# Patient Record
Sex: FEMALE | Race: WHITE | NOT HISPANIC OR LATINO | Employment: UNEMPLOYED | URBAN - METROPOLITAN AREA
[De-identification: names, ages, dates, MRNs, and addresses within clinical notes are randomized per-mention and may not be internally consistent; named-entity substitution may affect disease eponyms.]

---

## 2020-01-16 ENCOUNTER — HISTORICAL (OUTPATIENT)
Dept: PHYSICAL THERAPY | Facility: HOSPITAL | Age: 50
End: 2020-01-16

## 2020-01-22 ENCOUNTER — HISTORICAL (OUTPATIENT)
Dept: PHYSICAL THERAPY | Facility: HOSPITAL | Age: 50
End: 2020-01-22

## 2020-01-23 ENCOUNTER — HISTORICAL (OUTPATIENT)
Dept: PHYSICAL THERAPY | Facility: HOSPITAL | Age: 50
End: 2020-01-23

## 2020-01-24 ENCOUNTER — HISTORICAL (OUTPATIENT)
Dept: PHYSICAL THERAPY | Facility: HOSPITAL | Age: 50
End: 2020-01-24

## 2020-01-27 ENCOUNTER — HISTORICAL (OUTPATIENT)
Dept: PHYSICAL THERAPY | Facility: HOSPITAL | Age: 50
End: 2020-01-27

## 2020-01-29 ENCOUNTER — HISTORICAL (OUTPATIENT)
Dept: PHYSICAL THERAPY | Facility: HOSPITAL | Age: 50
End: 2020-01-29

## 2020-01-31 ENCOUNTER — HISTORICAL (OUTPATIENT)
Dept: PHYSICAL THERAPY | Facility: HOSPITAL | Age: 50
End: 2020-01-31

## 2020-02-03 ENCOUNTER — HISTORICAL (OUTPATIENT)
Dept: PHYSICAL THERAPY | Facility: HOSPITAL | Age: 50
End: 2020-02-03

## 2020-02-05 ENCOUNTER — HISTORICAL (OUTPATIENT)
Dept: PHYSICAL THERAPY | Facility: HOSPITAL | Age: 50
End: 2020-02-05

## 2020-02-07 ENCOUNTER — HISTORICAL (OUTPATIENT)
Dept: PHYSICAL THERAPY | Facility: HOSPITAL | Age: 50
End: 2020-02-07

## 2020-02-10 ENCOUNTER — HISTORICAL (OUTPATIENT)
Dept: PHYSICAL THERAPY | Facility: HOSPITAL | Age: 50
End: 2020-02-10

## 2020-02-12 ENCOUNTER — HISTORICAL (OUTPATIENT)
Dept: PHYSICAL THERAPY | Facility: HOSPITAL | Age: 50
End: 2020-02-12

## 2020-02-14 ENCOUNTER — HISTORICAL (OUTPATIENT)
Dept: PHYSICAL THERAPY | Facility: HOSPITAL | Age: 50
End: 2020-02-14

## 2020-02-17 ENCOUNTER — HISTORICAL (OUTPATIENT)
Dept: PHYSICAL THERAPY | Facility: HOSPITAL | Age: 50
End: 2020-02-17

## 2020-02-19 ENCOUNTER — HISTORICAL (OUTPATIENT)
Dept: PHYSICAL THERAPY | Facility: HOSPITAL | Age: 50
End: 2020-02-19

## 2020-02-21 ENCOUNTER — HISTORICAL (OUTPATIENT)
Dept: PHYSICAL THERAPY | Facility: HOSPITAL | Age: 50
End: 2020-02-21

## 2020-02-24 ENCOUNTER — HISTORICAL (OUTPATIENT)
Dept: PHYSICAL THERAPY | Facility: HOSPITAL | Age: 50
End: 2020-02-24

## 2020-02-26 ENCOUNTER — HISTORICAL (OUTPATIENT)
Dept: PHYSICAL THERAPY | Facility: HOSPITAL | Age: 50
End: 2020-02-26

## 2020-02-28 ENCOUNTER — HISTORICAL (OUTPATIENT)
Dept: PHYSICAL THERAPY | Facility: HOSPITAL | Age: 50
End: 2020-02-28

## 2020-03-02 ENCOUNTER — HISTORICAL (OUTPATIENT)
Dept: PHYSICAL THERAPY | Facility: HOSPITAL | Age: 50
End: 2020-03-02

## 2020-03-04 ENCOUNTER — HISTORICAL (OUTPATIENT)
Dept: PHYSICAL THERAPY | Facility: HOSPITAL | Age: 50
End: 2020-03-04

## 2020-03-10 ENCOUNTER — HISTORICAL (OUTPATIENT)
Dept: RADIOLOGY | Facility: HOSPITAL | Age: 50
End: 2020-03-10

## 2020-03-11 ENCOUNTER — HISTORICAL (OUTPATIENT)
Dept: PHYSICAL THERAPY | Facility: HOSPITAL | Age: 50
End: 2020-03-11

## 2020-06-16 ENCOUNTER — HISTORICAL (OUTPATIENT)
Dept: PHYSICAL THERAPY | Facility: HOSPITAL | Age: 50
End: 2020-06-16

## 2020-11-06 ENCOUNTER — HISTORICAL (OUTPATIENT)
Dept: SLEEP MEDICINE | Facility: HOSPITAL | Age: 50
End: 2020-11-06

## 2021-03-09 ENCOUNTER — HISTORICAL (OUTPATIENT)
Dept: ADMINISTRATIVE | Facility: HOSPITAL | Age: 51
End: 2021-03-09

## 2021-03-15 LAB
FINAL CULTURE: NORMAL
FINAL CULTURE: NORMAL

## 2021-04-08 ENCOUNTER — HISTORICAL (OUTPATIENT)
Dept: ADMINISTRATIVE | Facility: HOSPITAL | Age: 51
End: 2021-04-08

## 2021-04-08 LAB
ABS NEUT (OLG): 3.04 X10(3)/MCL (ref 2.1–9.2)
ALBUMIN SERPL-MCNC: 3.1 GM/DL (ref 3.5–5)
ALBUMIN/GLOB SERPL: 0.8 RATIO (ref 1.1–2)
ALP SERPL-CCNC: 71 UNIT/L (ref 40–150)
ALT SERPL-CCNC: 9 UNIT/L (ref 0–55)
ANISOCYTOSIS BLD QL SMEAR: ABNORMAL
AST SERPL-CCNC: 8 UNIT/L (ref 5–34)
BASOPHILS # BLD AUTO: 0.02 X10(3)/MCL (ref 0–0.2)
BASOPHILS NFR BLD AUTO: 0.4 % (ref 0–1)
BILIRUB SERPL-MCNC: 0.4 MG/DL (ref 0.2–1.2)
BILIRUBIN DIRECT+TOT PNL SERPL-MCNC: 0.2 MG/DL (ref 0–0.5)
BILIRUBIN DIRECT+TOT PNL SERPL-MCNC: 0.2 MG/DL (ref 0–0.8)
BUN SERPL-MCNC: 11 MG/DL (ref 9.8–20.1)
CALCIUM SERPL-MCNC: 10.3 MG/DL (ref 8.4–10.2)
CHLORIDE SERPL-SCNC: 105 MMOL/L (ref 98–107)
CHOLEST SERPL-MCNC: 123 MG/DL
CHOLEST/HDLC SERPL: 3 {RATIO} (ref 0–5)
CO2 SERPL-SCNC: 29 MMOL/L (ref 22–29)
CREAT SERPL-MCNC: 0.83 MG/DL (ref 0.57–1.11)
DEPRECATED CALCIDIOL+CALCIFEROL SERPL-MC: 29.1 NG/ML (ref 30–80)
EOSINOPHIL # BLD AUTO: 0.43 X10(3)/MCL (ref 0–0.9)
EOSINOPHIL NFR BLD AUTO: 8.3 % (ref 0–6.4)
ERYTHROCYTE [DISTWIDTH] IN BLOOD BY AUTOMATED COUNT: 15.8 % (ref 11.5–17)
FERRITIN SERPL-MCNC: 40.54 NG/ML (ref 4.63–204)
GLOBULIN SER-MCNC: 4 GM/DL (ref 2.4–3.5)
GLUCOSE SERPL-MCNC: 107 MG/DL (ref 74–100)
HCT VFR BLD AUTO: 33.5 % (ref 37–47)
HDLC SERPL-MCNC: 46 MG/DL (ref 40–60)
HGB BLD-MCNC: 10 GM/DL (ref 12–16)
HYPOCHROMIA BLD QL SMEAR: ABNORMAL
IMM GRANULOCYTES # BLD AUTO: 0.09 10*3/UL (ref 0–0.02)
IMM GRANULOCYTES NFR BLD AUTO: 1.7 % (ref 0–0.43)
IRON SATN MFR SERPL: 14 % (ref 20–50)
IRON SERPL-MCNC: 36 UG/DL (ref 50–170)
LDLC SERPL CALC-MCNC: 61 MG/DL (ref 50–140)
LYMPHOCYTES # BLD AUTO: 1.23 X10(3)/MCL (ref 0.6–4.6)
LYMPHOCYTES NFR BLD AUTO: 23.7 % (ref 16–44)
MCH RBC QN AUTO: 25.5 PG (ref 27–31)
MCHC RBC AUTO-ENTMCNC: 29.9 GM/DL (ref 33–36)
MCV RBC AUTO: 85.5 FL (ref 80–94)
MONOCYTES # BLD AUTO: 0.39 X10(3)/MCL (ref 0.1–1.3)
MONOCYTES NFR BLD AUTO: 7.5 % (ref 4–12.1)
NEUTROPHILS # BLD AUTO: 3.04 X10(3)/MCL (ref 2.1–9.2)
NEUTROPHILS NFR BLD AUTO: 58.4 % (ref 43–73)
NRBC BLD AUTO-RTO: 0 % (ref 0–0.2)
PLATELET # BLD AUTO: 329 X10(3)/MCL (ref 130–400)
PLATELET # BLD EST: ADEQUATE 10*3/UL
PMV BLD AUTO: 9 FL (ref 7.4–10.4)
POTASSIUM SERPL-SCNC: 4.2 MMOL/L (ref 3.5–5.1)
PREALB SERPL-MCNC: 14.3 MG/DL (ref 16–38)
PROT SERPL-MCNC: 7.1 GM/DL (ref 6.4–8.3)
RBC # BLD AUTO: 3.92 X10(6)/MCL (ref 4.2–5.4)
RBC MORPH BLD: ABNORMAL
SODIUM SERPL-SCNC: 143 MMOL/L (ref 136–145)
TIBC SERPL-MCNC: 213 UG/DL (ref 70–310)
TIBC SERPL-MCNC: 249 UG/DL (ref 250–450)
TRANSFERRIN SERPL-MCNC: 224 MG/DL (ref 180–382)
TRIGL SERPL-MCNC: 80 MG/DL (ref 0–150)
VLDLC SERPL CALC-MCNC: 16 MG/DL
WBC # SPEC AUTO: 5.2 X10(3)/MCL (ref 4.5–11.5)

## 2021-04-12 LAB
ABS NEUT (OLG): 3.4 X10(3)/MCL (ref 2.1–9.2)
ALBUMIN SERPL-MCNC: 3.2 GM/DL (ref 3.5–5)
ALBUMIN/GLOB SERPL: 0.8 RATIO (ref 1.1–2)
ALP SERPL-CCNC: 71 UNIT/L (ref 40–150)
ALT SERPL-CCNC: 7 UNIT/L (ref 0–55)
ANISOCYTOSIS BLD QL SMEAR: ABNORMAL
AST SERPL-CCNC: 8 UNIT/L (ref 5–34)
BASOPHILS # BLD AUTO: 0.05 X10(3)/MCL (ref 0–0.2)
BASOPHILS NFR BLD AUTO: 0.9 % (ref 0–1)
BILIRUB SERPL-MCNC: 0.3 MG/DL (ref 0.2–1.2)
BILIRUBIN DIRECT+TOT PNL SERPL-MCNC: 0.1 MG/DL (ref 0–0.8)
BILIRUBIN DIRECT+TOT PNL SERPL-MCNC: 0.2 MG/DL (ref 0–0.5)
BUN SERPL-MCNC: 18.9 MG/DL (ref 9.8–20.1)
CALCIUM SERPL-MCNC: 10.4 MG/DL (ref 8.4–10.2)
CHLORIDE SERPL-SCNC: 108 MMOL/L (ref 98–107)
CO2 SERPL-SCNC: 29 MMOL/L (ref 22–29)
CREAT SERPL-MCNC: 0.84 MG/DL (ref 0.57–1.11)
EOSINOPHIL # BLD AUTO: 0.31 X10(3)/MCL (ref 0–0.9)
EOSINOPHIL NFR BLD AUTO: 5.4 % (ref 0–6.4)
ERYTHROCYTE [DISTWIDTH] IN BLOOD BY AUTOMATED COUNT: 15.9 % (ref 11.5–17)
GLOBULIN SER-MCNC: 3.9 GM/DL (ref 2.4–3.5)
GLUCOSE SERPL-MCNC: 102 MG/DL (ref 74–100)
HCT VFR BLD AUTO: 33.8 % (ref 37–47)
HGB BLD-MCNC: 9.9 GM/DL (ref 12–16)
HYPOCHROMIA BLD QL SMEAR: ABNORMAL
IMM GRANULOCYTES # BLD AUTO: 0.06 10*3/UL (ref 0–0.02)
IMM GRANULOCYTES NFR BLD AUTO: 1 % (ref 0–0.43)
LYMPHOCYTES # BLD AUTO: 1.46 X10(3)/MCL (ref 0.6–4.6)
LYMPHOCYTES NFR BLD AUTO: 25.4 % (ref 16–44)
MCH RBC QN AUTO: 25.2 PG (ref 27–31)
MCHC RBC AUTO-ENTMCNC: 29.3 GM/DL (ref 33–36)
MCV RBC AUTO: 86 FL (ref 80–94)
MONOCYTES # BLD AUTO: 0.47 X10(3)/MCL (ref 0.1–1.3)
MONOCYTES NFR BLD AUTO: 8.2 % (ref 4–12.1)
NEUTROPHILS # BLD AUTO: 3.4 X10(3)/MCL (ref 2.1–9.2)
NEUTROPHILS NFR BLD AUTO: 59.1 % (ref 43–73)
NRBC BLD AUTO-RTO: 0 % (ref 0–0.2)
PLATELET # BLD AUTO: 305 X10(3)/MCL (ref 130–400)
PLATELET # BLD EST: ADEQUATE 10*3/UL
PMV BLD AUTO: 8.8 FL (ref 7.4–10.4)
POTASSIUM SERPL-SCNC: 4 MMOL/L (ref 3.5–5.1)
PREALB SERPL-MCNC: 17.6 MG/DL (ref 16–38)
PROT SERPL-MCNC: 7.1 GM/DL (ref 6.4–8.3)
RBC # BLD AUTO: 3.93 X10(6)/MCL (ref 4.2–5.4)
RBC MORPH BLD: ABNORMAL
SODIUM SERPL-SCNC: 147 MMOL/L (ref 136–145)
WBC # SPEC AUTO: 5.8 X10(3)/MCL (ref 4.5–11.5)

## 2021-04-13 LAB
ALBUMIN SERPL-MCNC: 3.1 GM/DL (ref 3.5–5)
ALBUMIN/GLOB SERPL: 0.9 RATIO (ref 1.1–2)
ALP SERPL-CCNC: 65 UNIT/L (ref 40–150)
ALT SERPL-CCNC: 8 UNIT/L (ref 0–55)
AST SERPL-CCNC: 6 UNIT/L (ref 5–34)
BILIRUB SERPL-MCNC: 0.3 MG/DL (ref 0.2–1.2)
BILIRUBIN DIRECT+TOT PNL SERPL-MCNC: 0.1 MG/DL (ref 0–0.5)
BILIRUBIN DIRECT+TOT PNL SERPL-MCNC: 0.2 MG/DL (ref 0–0.8)
BUN SERPL-MCNC: 18.7 MG/DL (ref 9.8–20.1)
CALCIUM SERPL-MCNC: 9.8 MG/DL (ref 8.4–10.2)
CHLORIDE SERPL-SCNC: 108 MMOL/L (ref 98–107)
CO2 SERPL-SCNC: 31 MMOL/L (ref 22–29)
CREAT SERPL-MCNC: 0.73 MG/DL (ref 0.57–1.11)
GLOBULIN SER-MCNC: 3.5 GM/DL (ref 2.4–3.5)
GLUCOSE SERPL-MCNC: 100 MG/DL (ref 74–100)
POTASSIUM SERPL-SCNC: 4 MMOL/L (ref 3.5–5.1)
PROT SERPL-MCNC: 6.6 GM/DL (ref 6.4–8.3)
SODIUM SERPL-SCNC: 145 MMOL/L (ref 136–145)

## 2021-04-19 LAB
ABS NEUT (OLG): 3.77 X10(3)/MCL (ref 2.1–9.2)
ALBUMIN SERPL-MCNC: 3.3 GM/DL (ref 3.5–5)
ALBUMIN/GLOB SERPL: 0.9 RATIO (ref 1.1–2)
ALP SERPL-CCNC: 64 UNIT/L (ref 40–150)
ALT SERPL-CCNC: 11 UNIT/L (ref 0–55)
AST SERPL-CCNC: 10 UNIT/L (ref 5–34)
BASOPHILS # BLD AUTO: 0.05 X10(3)/MCL (ref 0–0.2)
BASOPHILS NFR BLD AUTO: 0.9 % (ref 0–1)
BILIRUB SERPL-MCNC: 0.4 MG/DL (ref 0.2–1.2)
BILIRUBIN DIRECT+TOT PNL SERPL-MCNC: 0.2 MG/DL (ref 0–0.5)
BILIRUBIN DIRECT+TOT PNL SERPL-MCNC: 0.2 MG/DL (ref 0–0.8)
BUN SERPL-MCNC: 16.9 MG/DL (ref 9.8–20.1)
CALCIUM SERPL-MCNC: 9.9 MG/DL (ref 8.4–10.2)
CHLORIDE SERPL-SCNC: 108 MMOL/L (ref 98–107)
CO2 SERPL-SCNC: 29 MMOL/L (ref 22–29)
CREAT SERPL-MCNC: 0.83 MG/DL (ref 0.57–1.11)
EOSINOPHIL # BLD AUTO: 0.12 X10(3)/MCL (ref 0–0.9)
EOSINOPHIL NFR BLD AUTO: 2.1 % (ref 0–6.4)
ERYTHROCYTE [DISTWIDTH] IN BLOOD BY AUTOMATED COUNT: 16.1 % (ref 11.5–17)
GLOBULIN SER-MCNC: 3.5 GM/DL (ref 2.4–3.5)
GLUCOSE SERPL-MCNC: 111 MG/DL (ref 74–100)
HCT VFR BLD AUTO: 34.2 % (ref 37–47)
HGB BLD-MCNC: 9.9 GM/DL (ref 12–16)
HYPOCHROMIA BLD QL SMEAR: NORMAL
IMM GRANULOCYTES # BLD AUTO: 0.04 10*3/UL (ref 0–0.02)
IMM GRANULOCYTES NFR BLD AUTO: 0.7 % (ref 0–0.43)
LYMPHOCYTES # BLD AUTO: 1.29 X10(3)/MCL (ref 0.6–4.6)
LYMPHOCYTES NFR BLD AUTO: 22.1 % (ref 16–44)
MCH RBC QN AUTO: 24.9 PG (ref 27–31)
MCHC RBC AUTO-ENTMCNC: 28.9 GM/DL (ref 33–36)
MCV RBC AUTO: 85.9 FL (ref 80–94)
MONOCYTES # BLD AUTO: 0.56 X10(3)/MCL (ref 0.1–1.3)
MONOCYTES NFR BLD AUTO: 9.6 % (ref 4–12.1)
NEUTROPHILS # BLD AUTO: 3.77 X10(3)/MCL (ref 2.1–9.2)
NEUTROPHILS NFR BLD AUTO: 64.6 % (ref 43–73)
NRBC BLD AUTO-RTO: 0 % (ref 0–0.2)
PLATELET # BLD AUTO: 226 X10(3)/MCL (ref 130–400)
PLATELET # BLD EST: ADEQUATE 10*3/UL
PMV BLD AUTO: 9.3 FL (ref 7.4–10.4)
POTASSIUM SERPL-SCNC: 4 MMOL/L (ref 3.5–5.1)
PREALB SERPL-MCNC: 18.4 MG/DL (ref 16–38)
PROT SERPL-MCNC: 6.8 GM/DL (ref 6.4–8.3)
RBC # BLD AUTO: 3.98 X10(6)/MCL (ref 4.2–5.4)
SODIUM SERPL-SCNC: 143 MMOL/L (ref 136–145)
WBC # SPEC AUTO: 5.8 X10(3)/MCL (ref 4.5–11.5)

## 2021-04-26 LAB
ABS NEUT (OLG): 3.24 X10(3)/MCL (ref 2.1–9.2)
ALBUMIN SERPL-MCNC: 3.4 GM/DL (ref 3.5–5)
ALBUMIN/GLOB SERPL: 1.1 RATIO (ref 1.1–2)
ALP SERPL-CCNC: 59 UNIT/L (ref 40–150)
ALT SERPL-CCNC: 12 UNIT/L (ref 0–55)
ANISOCYTOSIS BLD QL SMEAR: ABNORMAL
AST SERPL-CCNC: 11 UNIT/L (ref 5–34)
BASOPHILS # BLD AUTO: 0.05 X10(3)/MCL (ref 0–0.2)
BASOPHILS NFR BLD AUTO: 0.9 % (ref 0–1)
BILIRUB SERPL-MCNC: 0.4 MG/DL (ref 0.2–1.2)
BILIRUBIN DIRECT+TOT PNL SERPL-MCNC: 0.2 MG/DL (ref 0–0.5)
BILIRUBIN DIRECT+TOT PNL SERPL-MCNC: 0.2 MG/DL (ref 0–0.8)
BUN SERPL-MCNC: 18.5 MG/DL (ref 9.8–20.1)
CALCIUM SERPL-MCNC: 9.9 MG/DL (ref 8.4–10.2)
CHLORIDE SERPL-SCNC: 105 MMOL/L (ref 98–107)
CO2 SERPL-SCNC: 28 MMOL/L (ref 22–29)
CREAT SERPL-MCNC: 0.75 MG/DL (ref 0.57–1.11)
EOSINOPHIL # BLD AUTO: 0.21 X10(3)/MCL (ref 0–0.9)
EOSINOPHIL NFR BLD AUTO: 3.6 % (ref 0–6.4)
ERYTHROCYTE [DISTWIDTH] IN BLOOD BY AUTOMATED COUNT: 17.3 % (ref 11.5–17)
GLOBULIN SER-MCNC: 3.2 GM/DL (ref 2.4–3.5)
GLUCOSE SERPL-MCNC: 109 MG/DL (ref 74–100)
HCT VFR BLD AUTO: 33 % (ref 37–47)
HGB BLD-MCNC: 9.7 GM/DL (ref 12–16)
HYPOCHROMIA BLD QL SMEAR: ABNORMAL
IMM GRANULOCYTES # BLD AUTO: 0.05 10*3/UL (ref 0–0.02)
IMM GRANULOCYTES NFR BLD AUTO: 0.9 % (ref 0–0.43)
LYMPHOCYTES # BLD AUTO: 1.59 X10(3)/MCL (ref 0.6–4.6)
LYMPHOCYTES NFR BLD AUTO: 27.6 % (ref 16–44)
MCH RBC QN AUTO: 25.2 PG (ref 27–31)
MCHC RBC AUTO-ENTMCNC: 29.4 GM/DL (ref 33–36)
MCV RBC AUTO: 85.7 FL (ref 80–94)
MICROCYTES BLD QL SMEAR: SLIGHT
MONOCYTES # BLD AUTO: 0.62 X10(3)/MCL (ref 0.1–1.3)
MONOCYTES NFR BLD AUTO: 10.8 % (ref 4–12.1)
NEUTROPHILS # BLD AUTO: 3.24 X10(3)/MCL (ref 2.1–9.2)
NEUTROPHILS NFR BLD AUTO: 56.2 % (ref 43–73)
NRBC BLD AUTO-RTO: 0 % (ref 0–0.2)
PLATELET # BLD AUTO: 224 X10(3)/MCL (ref 130–400)
PLATELET # BLD EST: ADEQUATE 10*3/UL
PMV BLD AUTO: 9.5 FL (ref 7.4–10.4)
POTASSIUM SERPL-SCNC: 4 MMOL/L (ref 3.5–5.1)
PREALB SERPL-MCNC: 21.4 MG/DL (ref 16–38)
PROT SERPL-MCNC: 6.6 GM/DL (ref 6.4–8.3)
RBC # BLD AUTO: 3.85 X10(6)/MCL (ref 4.2–5.4)
RBC MORPH BLD: ABNORMAL
SODIUM SERPL-SCNC: 140 MMOL/L (ref 136–145)
WBC # SPEC AUTO: 5.8 X10(3)/MCL (ref 4.5–11.5)

## 2021-05-03 LAB
ABS NEUT (OLG): 2.75 X10(3)/MCL (ref 2.1–9.2)
ALBUMIN SERPL-MCNC: 3.3 GM/DL (ref 3.5–5)
ALBUMIN/GLOB SERPL: 1 RATIO (ref 1.1–2)
ALP SERPL-CCNC: 60 UNIT/L (ref 40–150)
ALT SERPL-CCNC: 12 UNIT/L (ref 0–55)
ANISOCYTOSIS BLD QL SMEAR: ABNORMAL
AST SERPL-CCNC: 11 UNIT/L (ref 5–34)
BASOPHILS # BLD AUTO: 0.04 X10(3)/MCL (ref 0–0.2)
BASOPHILS NFR BLD AUTO: 0.8 % (ref 0–1)
BILIRUB SERPL-MCNC: 0.4 MG/DL (ref 0.2–1.2)
BILIRUBIN DIRECT+TOT PNL SERPL-MCNC: 0.2 MG/DL (ref 0–0.5)
BILIRUBIN DIRECT+TOT PNL SERPL-MCNC: 0.2 MG/DL (ref 0–0.8)
BUN SERPL-MCNC: 15.6 MG/DL (ref 9.8–20.1)
CALCIUM SERPL-MCNC: 9.9 MG/DL (ref 8.4–10.2)
CHLORIDE SERPL-SCNC: 105 MMOL/L (ref 98–107)
CO2 SERPL-SCNC: 28 MMOL/L (ref 22–29)
CREAT SERPL-MCNC: 0.83 MG/DL (ref 0.57–1.11)
EOSINOPHIL # BLD AUTO: 0.23 X10(3)/MCL (ref 0–0.9)
EOSINOPHIL NFR BLD AUTO: 4.8 % (ref 0–6.4)
ERYTHROCYTE [DISTWIDTH] IN BLOOD BY AUTOMATED COUNT: 18 % (ref 11.5–17)
GLOBULIN SER-MCNC: 3.3 GM/DL (ref 2.4–3.5)
GLUCOSE SERPL-MCNC: 100 MG/DL (ref 74–100)
HCT VFR BLD AUTO: 32.9 % (ref 37–47)
HGB BLD-MCNC: 9.7 GM/DL (ref 12–16)
HYPOCHROMIA BLD QL SMEAR: ABNORMAL
IMM GRANULOCYTES # BLD AUTO: 0.03 10*3/UL (ref 0–0.02)
IMM GRANULOCYTES NFR BLD AUTO: 0.6 % (ref 0–0.43)
LYMPHOCYTES # BLD AUTO: 1.26 X10(3)/MCL (ref 0.6–4.6)
LYMPHOCYTES NFR BLD AUTO: 26.3 % (ref 16–44)
MACROCYTES BLD QL SMEAR: SLIGHT
MCH RBC QN AUTO: 25.7 PG (ref 27–31)
MCHC RBC AUTO-ENTMCNC: 29.5 GM/DL (ref 33–36)
MCV RBC AUTO: 87.3 FL (ref 80–94)
MICROCYTES BLD QL SMEAR: SLIGHT
MONOCYTES # BLD AUTO: 0.48 X10(3)/MCL (ref 0.1–1.3)
MONOCYTES NFR BLD AUTO: 10 % (ref 4–12.1)
NEUTROPHILS # BLD AUTO: 2.75 X10(3)/MCL (ref 2.1–9.2)
NEUTROPHILS NFR BLD AUTO: 57.5 % (ref 43–73)
NRBC BLD AUTO-RTO: 0 % (ref 0–0.2)
PLATELET # BLD AUTO: 188 X10(3)/MCL (ref 130–400)
PLATELET # BLD EST: ADEQUATE 10*3/UL
PMV BLD AUTO: 9.5 FL (ref 7.4–10.4)
POTASSIUM SERPL-SCNC: 4.2 MMOL/L (ref 3.5–5.1)
PREALB SERPL-MCNC: 19.9 MG/DL (ref 16–38)
PROT SERPL-MCNC: 6.6 GM/DL (ref 6.4–8.3)
RBC # BLD AUTO: 3.77 X10(6)/MCL (ref 4.2–5.4)
RBC MORPH BLD: ABNORMAL
SODIUM SERPL-SCNC: 141 MMOL/L (ref 136–145)
WBC # SPEC AUTO: 4.8 X10(3)/MCL (ref 4.5–11.5)

## 2021-10-09 ENCOUNTER — HISTORICAL (OUTPATIENT)
Dept: RADIOLOGY | Facility: HOSPITAL | Age: 51
End: 2021-10-09

## 2022-01-12 ENCOUNTER — HISTORICAL (OUTPATIENT)
Dept: LAB | Facility: HOSPITAL | Age: 52
End: 2022-01-12

## 2022-01-12 LAB
ABS NEUT (OLG): 4.24 X10(3)/MCL (ref 2.1–9.2)
ALBUMIN SERPL-MCNC: 3.9 GM/DL (ref 3.5–5)
ALBUMIN/GLOB SERPL: 1 RATIO (ref 1.1–2)
ALP SERPL-CCNC: 77 UNIT/L (ref 40–150)
ALT SERPL-CCNC: 22 UNIT/L (ref 0–55)
AST SERPL-CCNC: 18 UNIT/L (ref 5–34)
BASOPHILS # BLD AUTO: 0 X10(3)/MCL (ref 0–0.2)
BASOPHILS NFR BLD AUTO: 1 %
BILIRUB SERPL-MCNC: 0.4 MG/DL
BILIRUBIN DIRECT+TOT PNL SERPL-MCNC: 0.2 MG/DL (ref 0–0.5)
BILIRUBIN DIRECT+TOT PNL SERPL-MCNC: 0.2 MG/DL (ref 0–0.8)
BUN SERPL-MCNC: 17.5 MG/DL (ref 9.8–20.1)
CALCIUM SERPL-MCNC: 10.2 MG/DL (ref 8.7–10.5)
CHLORIDE SERPL-SCNC: 99 MMOL/L (ref 98–107)
CHOLEST SERPL-MCNC: 163 MG/DL
CHOLEST/HDLC SERPL: 2 {RATIO} (ref 0–5)
CO2 SERPL-SCNC: 32 MMOL/L (ref 22–29)
CREAT SERPL-MCNC: 0.89 MG/DL (ref 0.55–1.02)
DEPRECATED CALCIDIOL+CALCIFEROL SERPL-MC: 53.2 NG/ML (ref 30–80)
EOSINOPHIL # BLD AUTO: 0.2 X10(3)/MCL (ref 0–0.9)
EOSINOPHIL NFR BLD AUTO: 3 %
ERYTHROCYTE [DISTWIDTH] IN BLOOD BY AUTOMATED COUNT: 13.7 % (ref 11.5–17)
EST. AVERAGE GLUCOSE BLD GHB EST-MCNC: 96.8 MG/DL
FOLATE SERPL-MCNC: 15.8 NG/ML (ref 7–31.4)
FT4I SERPL CALC-MCNC: 3.03 (ref 2.6–3.6)
GLOBULIN SER-MCNC: 4.1 GM/DL (ref 2.4–3.5)
GLUCOSE SERPL-MCNC: 90 MG/DL (ref 74–100)
HBA1C MFR BLD: 5 %
HCT VFR BLD AUTO: 39.5 % (ref 37–47)
HDLC SERPL-MCNC: 69 MG/DL (ref 35–60)
HGB BLD-MCNC: 11.7 GM/DL (ref 12–16)
IMM GRANULOCYTES # BLD AUTO: 0.01 % (ref 0–0.02)
IMM GRANULOCYTES NFR BLD AUTO: 0.2 % (ref 0–0.43)
LDLC SERPL CALC-MCNC: 72 MG/DL (ref 50–140)
LYMPHOCYTES # BLD AUTO: 1.6 X10(3)/MCL (ref 0.6–4.6)
LYMPHOCYTES NFR BLD AUTO: 25 %
MCH RBC QN AUTO: 26.5 PG (ref 27–31)
MCHC RBC AUTO-ENTMCNC: 29.6 GM/DL (ref 33–36)
MCV RBC AUTO: 89.4 FL (ref 80–94)
MONOCYTES # BLD AUTO: 0.3 X10(3)/MCL (ref 0.1–1.3)
MONOCYTES NFR BLD AUTO: 5 %
NEUTROPHILS # BLD AUTO: 4.24 X10(3)/MCL (ref 1.4–7.9)
NEUTROPHILS NFR BLD AUTO: 66 %
PLATELET # BLD AUTO: 237 X10(3)/MCL (ref 130–400)
PMV BLD AUTO: 9.9 FL (ref 9.4–12.4)
POTASSIUM SERPL-SCNC: 4.3 MMOL/L (ref 3.5–5.1)
PROT SERPL-MCNC: 8 GM/DL (ref 6.4–8.3)
RBC # BLD AUTO: 4.42 X10(6)/MCL (ref 4.2–5.4)
SODIUM SERPL-SCNC: 138 MMOL/L (ref 136–145)
T3RU NFR SERPL: 35.91 % (ref 31–39)
T4 SERPL-MCNC: 8.45 UG/DL (ref 4.87–11.72)
TRIGL SERPL-MCNC: 111 MG/DL (ref 37–140)
TSH SERPL-ACNC: 2.56 UIU/ML (ref 0.35–4.94)
VIT B12 SERPL-MCNC: 728 PG/ML (ref 213–816)
VLDLC SERPL CALC-MCNC: 22 MG/DL
WBC # SPEC AUTO: 6.4 X10(3)/MCL (ref 4.5–11.5)

## 2022-02-09 ENCOUNTER — HISTORICAL (OUTPATIENT)
Dept: RADIOLOGY | Facility: HOSPITAL | Age: 52
End: 2022-02-09

## 2022-02-16 ENCOUNTER — HISTORICAL (OUTPATIENT)
Dept: RADIOLOGY | Facility: HOSPITAL | Age: 52
End: 2022-02-16

## 2022-02-16 ENCOUNTER — HISTORICAL (OUTPATIENT)
Dept: ADMINISTRATIVE | Facility: HOSPITAL | Age: 52
End: 2022-02-16

## 2022-04-11 ENCOUNTER — HISTORICAL (OUTPATIENT)
Dept: ADMINISTRATIVE | Facility: HOSPITAL | Age: 52
End: 2022-04-11
Payer: MEDICAID

## 2022-04-26 VITALS
HEIGHT: 67 IN | WEIGHT: 240.94 LBS | BODY MASS INDEX: 37.82 KG/M2 | DIASTOLIC BLOOD PRESSURE: 81 MMHG | SYSTOLIC BLOOD PRESSURE: 116 MMHG

## 2022-05-20 ENCOUNTER — HOSPITAL ENCOUNTER (OUTPATIENT)
Facility: HOSPITAL | Age: 52
Discharge: HOME | End: 2022-05-27
Attending: EMERGENCY MEDICINE | Admitting: INTERNAL MEDICINE
Payer: MEDICAID

## 2022-05-20 DIAGNOSIS — J96.01 ACUTE RESPIRATORY FAILURE WITH HYPOXIA AND HYPERCARBIA: ICD-10-CM

## 2022-05-20 DIAGNOSIS — M79.89 LEG SWELLING: ICD-10-CM

## 2022-05-20 DIAGNOSIS — J96.02 ACUTE RESPIRATORY FAILURE WITH HYPOXIA AND HYPERCARBIA: ICD-10-CM

## 2022-05-20 DIAGNOSIS — R06.02 SHORTNESS OF BREATH: ICD-10-CM

## 2022-05-20 DIAGNOSIS — J44.1 COPD EXACERBATION: Primary | ICD-10-CM

## 2022-05-20 DIAGNOSIS — R06.02 SOB (SHORTNESS OF BREATH): ICD-10-CM

## 2022-05-20 DIAGNOSIS — R52 PAIN: ICD-10-CM

## 2022-05-20 LAB
ALBUMIN SERPL-MCNC: 3.4 GM/DL (ref 3.5–5)
ALBUMIN/GLOB SERPL: 0.8 RATIO (ref 1.1–2)
ALP SERPL-CCNC: 74 UNIT/L (ref 40–150)
ALT SERPL-CCNC: 17 UNIT/L (ref 0–55)
AST SERPL-CCNC: 15 UNIT/L (ref 5–34)
BASOPHILS # BLD AUTO: 0.03 X10(3)/MCL (ref 0–0.2)
BASOPHILS NFR BLD AUTO: 0.4 %
BILIRUBIN DIRECT+TOT PNL SERPL-MCNC: 0.3 MG/DL
BNP BLD-MCNC: <10 PG/ML
BUN SERPL-MCNC: 11.6 MG/DL (ref 9.8–20.1)
CALCIUM SERPL-MCNC: 10.2 MG/DL (ref 8.4–10.2)
CHLORIDE SERPL-SCNC: 102 MMOL/L (ref 98–107)
CO2 SERPL-SCNC: 30 MMOL/L (ref 22–29)
CREAT SERPL-MCNC: 0.74 MG/DL (ref 0.55–1.02)
EOSINOPHIL # BLD AUTO: 0.12 X10(3)/MCL (ref 0–0.9)
EOSINOPHIL NFR BLD AUTO: 1.8 %
ERYTHROCYTE [DISTWIDTH] IN BLOOD BY AUTOMATED COUNT: 15.2 % (ref 11.5–17)
GLOBULIN SER-MCNC: 4.1 GM/DL (ref 2.4–3.5)
GLUCOSE SERPL-MCNC: 125 MG/DL (ref 74–100)
HCT VFR BLD AUTO: 38 % (ref 37–47)
HGB BLD-MCNC: 11.5 GM/DL (ref 12–16)
IMM GRANULOCYTES # BLD AUTO: 0.03 X10(3)/MCL (ref 0–0.02)
IMM GRANULOCYTES NFR BLD AUTO: 0.4 % (ref 0–0.43)
LYMPHOCYTES # BLD AUTO: 0.87 X10(3)/MCL (ref 0.6–4.6)
LYMPHOCYTES NFR BLD AUTO: 13 %
MCH RBC QN AUTO: 26.9 PG (ref 27–31)
MCHC RBC AUTO-ENTMCNC: 30.3 MG/DL (ref 33–36)
MCV RBC AUTO: 88.8 FL (ref 80–94)
MONOCYTES # BLD AUTO: 0.37 X10(3)/MCL (ref 0.1–1.3)
MONOCYTES NFR BLD AUTO: 5.5 %
NEUTROPHILS # BLD AUTO: 5.3 X10(3)/MCL (ref 2.1–9.2)
NEUTROPHILS NFR BLD AUTO: 78.9 %
NRBC BLD AUTO-RTO: 0 %
PLATELET # BLD AUTO: 222 X10(3)/MCL (ref 130–400)
PMV BLD AUTO: 9.3 FL (ref 9.4–12.4)
POTASSIUM SERPL-SCNC: 4.2 MMOL/L (ref 3.5–5.1)
PROT SERPL-MCNC: 7.5 GM/DL (ref 6.4–8.3)
RBC # BLD AUTO: 4.28 X10(6)/MCL (ref 4.2–5.4)
SODIUM SERPL-SCNC: 140 MMOL/L (ref 136–145)
TROPONIN I SERPL-MCNC: <0.01 NG/ML (ref 0–0.04)
WBC # SPEC AUTO: 6.7 X10(3)/MCL (ref 4.5–11.5)

## 2022-05-20 PROCEDURE — G0378 HOSPITAL OBSERVATION PER HR: HCPCS

## 2022-05-20 PROCEDURE — 85025 COMPLETE CBC W/AUTO DIFF WBC: CPT | Performed by: EMERGENCY MEDICINE

## 2022-05-20 PROCEDURE — 27000207 HC ISOLATION

## 2022-05-20 PROCEDURE — 36415 COLL VENOUS BLD VENIPUNCTURE: CPT | Performed by: EMERGENCY MEDICINE

## 2022-05-20 PROCEDURE — 83880 ASSAY OF NATRIURETIC PEPTIDE: CPT | Performed by: EMERGENCY MEDICINE

## 2022-05-20 PROCEDURE — 94640 AIRWAY INHALATION TREATMENT: CPT | Mod: XB

## 2022-05-20 PROCEDURE — 25000003 PHARM REV CODE 250: Performed by: PHYSICIAN ASSISTANT

## 2022-05-20 PROCEDURE — 94645 CONT INHLJ TX EACH ADDL HOUR: CPT

## 2022-05-20 PROCEDURE — 84484 ASSAY OF TROPONIN QUANT: CPT | Performed by: EMERGENCY MEDICINE

## 2022-05-20 PROCEDURE — 93005 ELECTROCARDIOGRAM TRACING: CPT

## 2022-05-20 PROCEDURE — 25000242 PHARM REV CODE 250 ALT 637 W/ HCPCS: Performed by: EMERGENCY MEDICINE

## 2022-05-20 PROCEDURE — 25000242 PHARM REV CODE 250 ALT 637 W/ HCPCS: Performed by: INTERNAL MEDICINE

## 2022-05-20 PROCEDURE — 93010 EKG 12-LEAD: ICD-10-PCS | Mod: ,,, | Performed by: INTERNAL MEDICINE

## 2022-05-20 PROCEDURE — 25000003 PHARM REV CODE 250: Performed by: INTERNAL MEDICINE

## 2022-05-20 PROCEDURE — 94640 AIRWAY INHALATION TREATMENT: CPT

## 2022-05-20 PROCEDURE — 99285 EMERGENCY DEPT VISIT HI MDM: CPT | Mod: 25

## 2022-05-20 PROCEDURE — 63600175 PHARM REV CODE 636 W HCPCS: Performed by: PHYSICIAN ASSISTANT

## 2022-05-20 PROCEDURE — 11000001 HC ACUTE MED/SURG PRIVATE ROOM

## 2022-05-20 PROCEDURE — 63600175 PHARM REV CODE 636 W HCPCS: Performed by: INTERNAL MEDICINE

## 2022-05-20 PROCEDURE — 25000242 PHARM REV CODE 250 ALT 637 W/ HCPCS: Performed by: PHYSICIAN ASSISTANT

## 2022-05-20 PROCEDURE — 27000221 HC OXYGEN, UP TO 24 HOURS

## 2022-05-20 PROCEDURE — 93010 ELECTROCARDIOGRAM REPORT: CPT | Mod: ,,, | Performed by: INTERNAL MEDICINE

## 2022-05-20 PROCEDURE — 80053 COMPREHEN METABOLIC PANEL: CPT | Performed by: EMERGENCY MEDICINE

## 2022-05-20 PROCEDURE — 96372 THER/PROPH/DIAG INJ SC/IM: CPT | Performed by: INTERNAL MEDICINE

## 2022-05-20 RX ORDER — IPRATROPIUM BROMIDE AND ALBUTEROL SULFATE 2.5; .5 MG/3ML; MG/3ML
3 SOLUTION RESPIRATORY (INHALATION) EVERY 4 HOURS
Status: DISCONTINUED | OUTPATIENT
Start: 2022-05-20 | End: 2022-05-27 | Stop reason: HOSPADM

## 2022-05-20 RX ORDER — IPRATROPIUM BROMIDE AND ALBUTEROL SULFATE 2.5; .5 MG/3ML; MG/3ML
3 SOLUTION RESPIRATORY (INHALATION) EVERY 4 HOURS
Status: DISCONTINUED | OUTPATIENT
Start: 2022-05-20 | End: 2022-05-20

## 2022-05-20 RX ORDER — MEMANTINE HYDROCHLORIDE 10 MG/1
5 TABLET ORAL 2 TIMES DAILY
COMMUNITY

## 2022-05-20 RX ORDER — ALBUTEROL SULFATE 5 MG/ML
2.5 SOLUTION RESPIRATORY (INHALATION) EVERY 6 HOURS PRN
Status: ON HOLD | COMMUNITY
End: 2022-05-27 | Stop reason: HOSPADM

## 2022-05-20 RX ORDER — CALCIUM CARBONATE 600 MG
600 TABLET ORAL ONCE
COMMUNITY

## 2022-05-20 RX ORDER — IBUPROFEN 200 MG
24 TABLET ORAL
Status: DISCONTINUED | OUTPATIENT
Start: 2022-05-20 | End: 2022-05-27 | Stop reason: HOSPADM

## 2022-05-20 RX ORDER — BISACODYL 5 MG
5 TABLET, DELAYED RELEASE (ENTERIC COATED) ORAL DAILY PRN
COMMUNITY

## 2022-05-20 RX ORDER — PANTOPRAZOLE SODIUM 40 MG/1
40 TABLET, DELAYED RELEASE ORAL DAILY
COMMUNITY

## 2022-05-20 RX ORDER — ALBUTEROL SULFATE 2.5 MG/.5ML
10 SOLUTION RESPIRATORY (INHALATION) ONCE
Status: COMPLETED | OUTPATIENT
Start: 2022-05-20 | End: 2022-05-20

## 2022-05-20 RX ORDER — LAMOTRIGINE 25 MG/1
25 TABLET ORAL DAILY
COMMUNITY

## 2022-05-20 RX ORDER — FLUTICASONE PROPIONATE 50 MCG
1 SPRAY, SUSPENSION (ML) NASAL DAILY
COMMUNITY

## 2022-05-20 RX ORDER — IBUPROFEN 200 MG
16 TABLET ORAL
Status: DISCONTINUED | OUTPATIENT
Start: 2022-05-20 | End: 2022-05-27 | Stop reason: HOSPADM

## 2022-05-20 RX ORDER — INSULIN ASPART 100 [IU]/ML
0-5 INJECTION, SOLUTION INTRAVENOUS; SUBCUTANEOUS
Status: DISCONTINUED | OUTPATIENT
Start: 2022-05-20 | End: 2022-05-21

## 2022-05-20 RX ORDER — ENOXAPARIN SODIUM 100 MG/ML
40 INJECTION SUBCUTANEOUS EVERY 24 HOURS
Status: DISCONTINUED | OUTPATIENT
Start: 2022-05-20 | End: 2022-05-27 | Stop reason: HOSPADM

## 2022-05-20 RX ORDER — ALENDRONATE SODIUM 70 MG/1
70 TABLET ORAL
COMMUNITY

## 2022-05-20 RX ORDER — ASCORBIC ACID 500 MG
500 TABLET ORAL DAILY
COMMUNITY

## 2022-05-20 RX ORDER — ATORVASTATIN CALCIUM 10 MG/1
10 TABLET, FILM COATED ORAL DAILY
COMMUNITY

## 2022-05-20 RX ORDER — ARIPIPRAZOLE 5 MG/1
5 TABLET ORAL DAILY
COMMUNITY

## 2022-05-20 RX ORDER — ACETAMINOPHEN 325 MG/1
650 TABLET ORAL EVERY 4 HOURS PRN
Status: DISCONTINUED | OUTPATIENT
Start: 2022-05-20 | End: 2022-05-27 | Stop reason: HOSPADM

## 2022-05-20 RX ORDER — GABAPENTIN 300 MG/1
300 CAPSULE ORAL 3 TIMES DAILY
COMMUNITY

## 2022-05-20 RX ORDER — ONDANSETRON 2 MG/ML
4 INJECTION INTRAMUSCULAR; INTRAVENOUS EVERY 8 HOURS PRN
Status: DISCONTINUED | OUTPATIENT
Start: 2022-05-20 | End: 2022-05-27 | Stop reason: HOSPADM

## 2022-05-20 RX ORDER — ACETAMINOPHEN 325 MG/1
650 TABLET ORAL EVERY 8 HOURS PRN
Status: DISCONTINUED | OUTPATIENT
Start: 2022-05-20 | End: 2022-05-27 | Stop reason: HOSPADM

## 2022-05-20 RX ORDER — NITROFURANTOIN 25; 75 MG/1; MG/1
100 CAPSULE ORAL
Status: ON HOLD | COMMUNITY
End: 2022-05-27 | Stop reason: HOSPADM

## 2022-05-20 RX ORDER — AMLODIPINE BESYLATE 10 MG/1
10 TABLET ORAL DAILY
COMMUNITY

## 2022-05-20 RX ORDER — GLUCAGON 1 MG
1 KIT INJECTION
Status: DISCONTINUED | OUTPATIENT
Start: 2022-05-20 | End: 2022-05-27 | Stop reason: HOSPADM

## 2022-05-20 RX ADMIN — AZITHROMYCIN 500 MG: 500 INJECTION, POWDER, LYOPHILIZED, FOR SOLUTION INTRAVENOUS at 11:05

## 2022-05-20 RX ADMIN — METHYLPREDNISOLONE SODIUM SUCCINATE 60 MG: 40 INJECTION, POWDER, FOR SOLUTION INTRAMUSCULAR; INTRAVENOUS at 09:05

## 2022-05-20 RX ADMIN — ALBUTEROL SULFATE 10 MG: 2.5 SOLUTION RESPIRATORY (INHALATION) at 12:05

## 2022-05-20 RX ADMIN — IPRATROPIUM BROMIDE AND ALBUTEROL SULFATE 3 ML: 2.5; .5 SOLUTION RESPIRATORY (INHALATION) at 08:05

## 2022-05-20 RX ADMIN — ENOXAPARIN SODIUM 40 MG: 40 INJECTION SUBCUTANEOUS at 09:05

## 2022-05-20 RX ADMIN — IPRATROPIUM BROMIDE AND ALBUTEROL SULFATE 3 ML: .5; 3 SOLUTION RESPIRATORY (INHALATION) at 04:05

## 2022-05-20 NOTE — ED NOTES
No evidence of deep vein thrombosis identified in the left lower extremity.    Verbal given to Kristofer Mcallister MD.

## 2022-05-20 NOTE — ED PROVIDER NOTES
Encounter Date: 5/20/2022    SCRIBE #1 NOTE: I, Shari Muir, am scribing for, and in the presence of,  Kristofer Mcallister MD. I have scribed the following portions of the note - Other sections scribed: HPI, ROS, and physical examination.       History     Chief Complaint   Patient presents with    Shortness of Breath     Pt to ED via [facility] for SOB since yesterday AM. PMH includes COPD. Per EMS, RA O2 sat 88-89%, improved with 2LPM NC, duoneb and solumedrol.        51 y.o. female with a history of COPD presents to ED c/o SOB and wheezing that began yesterday. Pt reports having an associated dry cough. No fever. She quit smoking 3 years ago. Pt not on oxygen chronically. Pt notes having chronic swelling to LLE due to previous CVA. No history of CHF or PE.    PCP: Nazia Zhao NP    The history is provided by the patient. No  was used.   Shortness of Breath  This is a new problem. The problem occurs continuously.The current episode started yesterday. Associated symptoms include cough, wheezing and leg swelling (chronic). Pertinent negatives include no fever, no headaches, no ear pain, no chest pain, no vomiting, no abdominal pain and no rash. She has tried nothing for the symptoms. Associated medical issues include COPD.     Review of patient's allergies indicates:   Allergen Reactions    Sulfamethoxazole-trimethoprim Hives and Other (See Comments)     Other reaction(s): Other (See Comments)     No past medical history on file.  No past surgical history on file.  No family history on file.     Review of Systems   Constitutional: Negative for chills and fever.   HENT: Negative for congestion and ear pain.    Eyes: Negative for discharge.   Respiratory: Positive for cough, shortness of breath and wheezing.    Cardiovascular: Positive for leg swelling (chronic). Negative for chest pain.   Gastrointestinal: Negative for abdominal pain, constipation, diarrhea, nausea and vomiting.   Genitourinary:  Negative for dysuria, flank pain and frequency.   Musculoskeletal: Negative for back pain and joint swelling.   Skin: Negative for rash.   Neurological: Negative for dizziness, weakness and headaches.   Psychiatric/Behavioral: Negative for agitation, confusion and hallucinations.       Physical Exam     Initial Vitals [05/20/22 1039]   BP Pulse Resp Temp SpO2   112/86 75 19 97.9 °F (36.6 °C) 98 %      MAP       --         Physical Exam    Nursing note and vitals reviewed.  Constitutional: She appears well-developed. No distress.   HENT:   Head: Normocephalic and atraumatic.   Mouth/Throat: Oropharynx is clear and moist.   Eyes: Conjunctivae and EOM are normal. Pupils are equal, round, and reactive to light.   Neck: Neck supple.   Cardiovascular: Normal rate and regular rhythm.   No murmur heard.  1+ pitting edema LLE. No edema in RLE   Pulmonary/Chest: No accessory muscle usage. No respiratory distress. She exhibits no tenderness.   Moderate expiratory wheezing bilaterally.    Abdominal: Abdomen is soft. Bowel sounds are normal. She exhibits no distension. There is no abdominal tenderness.   Musculoskeletal:         General: Normal range of motion.      Cervical back: Neck supple.      Lumbar back: Normal. Normal range of motion.     Neurological: She is alert and oriented to person, place, and time. She has normal strength. No cranial nerve deficit or sensory deficit.   Psychiatric: She has a normal mood and affect. Judgment normal.         ED Course   Procedures  Labs Reviewed   COMPREHENSIVE METABOLIC PANEL - Abnormal; Notable for the following components:       Result Value    Carbon Dioxide 30 (*)     Glucose Level 125 (*)     Albumin Level 3.4 (*)     Globulin 4.1 (*)     Albumin/Globulin Ratio 0.8 (*)     All other components within normal limits   CBC WITH DIFFERENTIAL - Abnormal; Notable for the following components:    Hgb 11.5 (*)     MCH 26.9 (*)     MCHC 30.3 (*)     MPV 9.3 (*)     IG# 0.03 (*)     All  other components within normal limits   B-TYPE NATRIURETIC PEPTIDE - Normal   TROPONIN I - Normal   CBC W/ AUTO DIFFERENTIAL    Narrative:     The following orders were created for panel order CBC Auto Differential.  Procedure                               Abnormality         Status                     ---------                               -----------         ------                     CBC with Differential[[id number]]        Abnormal            Final result                 Please view results for these tests on the individual orders.   SARS-COV-2 RNA AMPLIFICATION, QUAL     EKG Readings: (Independently Interpreted)   Rate 77, normal intervals, no ischemic changes, poor r wave progression.     ECG Results          EKG 12-lead (In process)  Result time 05/20/22 12:51:14    In process by Interface, Lab In [facility] (05/20/22 12:51:14)                 Narrative:    Test Reason : R06.02,    Vent. Rate : 077 BPM     Atrial Rate : 077 BPM     P-R Int : 152 ms          QRS Dur : 096 ms      QT Int : 382 ms       P-R-T Axes : 028 052 044 degrees     QTc Int : 432 ms    Normal sinus rhythm  Low voltage QRS  Borderline Abnormal ECG  No previous ECGs available    Referred By: [email or web address]   SELF           Confirmed By:                             Imaging Results          X-Ray Chest 1 View (Final result)  Result time 05/20/22 11:32:02    Final result by Neno Rubio MD (05/20/22 11:32:02)                 Impression:      No acute chest disease is identified.      Electronically signed by: Neno Rubio  Date:    05/20/2022  Time:    11:32             Narrative:    EXAMINATION:  XR CHEST 1 VIEW    CLINICAL HISTORY:  , Shortness of breath.    FINDINGS:  No alveolar consolidation, effusion, or pneumothorax is seen.   The thoracic aorta is ectatic and calcified, but otherwise the  cardiac silhouette, central pulmonary vessels and mediastinum are normal in size and are grossly unremarkable. Except for degenerative changes,  the  visualized osseous structures are grossly unremarkable.                                 Medications   albuterol-ipratropium 2.5 mg-0.5 mg/3 mL nebulizer solution 3 mL (3 mLs Nebulization Given 5/20/22 1629)   albuterol sulfate nebulizer solution 10 mg (10 mg Nebulization Given 5/20/22 1202)     Medical Decision Making:   Clinical Tests:   Lab Tests: Ordered and Reviewed  Radiological Study: Ordered and Reviewed  ED Management:  Sats still upper80's on ra, pt not on home o2            Scribe Attestation:   Scribe #1: I performed the above scribed service and the documentation accurately describes the services I performed. I attest to the accuracy of the note.    Attending Attestation:           Physician Attestation for Scribe:  Physician Attestation Statement for Scribe #1: I, Kristofer Mcallister MD, reviewed documentation, as scribed by Shari Muir in my presence, and it is both accurate and complete.             ED Course as of 05/20/22 1631   Fri May 20, 2022   1559 Hospitalist paged [TJ]      ED Course User Index  [TJ] Shari Muir             Clinical Impression:   Final diagnoses:  [R06.02] Shortness of breath  [M79.89] Leg swelling  [J44.1] COPD exacerbation (Primary)          ED Disposition Condition    Admit               Kristofer Mcallister MD  05/20/22 1610       Kristofer Mcallister MD  05/20/22 1612       Kristofer Mcallister MD  05/20/22 1631

## 2022-05-21 PROBLEM — M79.89 LEG SWELLING: Status: ACTIVE | Noted: 2022-05-21

## 2022-05-21 PROBLEM — R06.02 SHORTNESS OF BREATH: Status: ACTIVE | Noted: 2022-05-21

## 2022-05-21 PROBLEM — J96.01 ACUTE RESPIRATORY FAILURE WITH HYPOXIA AND HYPERCARBIA: Status: ACTIVE | Noted: 2022-05-21

## 2022-05-21 PROBLEM — J44.1 COPD EXACERBATION: Chronic | Status: ACTIVE | Noted: 2022-05-21

## 2022-05-21 PROBLEM — J96.02 ACUTE RESPIRATORY FAILURE WITH HYPOXIA AND HYPERCARBIA: Status: ACTIVE | Noted: 2022-05-21

## 2022-05-21 LAB
GLUCOSE SERPL-MCNC: 133 MG/DL (ref 70–110)
POCT GLUCOSE: 133 MG/DL (ref 70–110)
POCT GLUCOSE: 136 MG/DL (ref 70–110)
POCT GLUCOSE: 139 MG/DL (ref 70–110)
POCT GLUCOSE: 151 MG/DL (ref 70–110)

## 2022-05-21 PROCEDURE — 25000003 PHARM REV CODE 250: Performed by: INTERNAL MEDICINE

## 2022-05-21 PROCEDURE — 25000242 PHARM REV CODE 250 ALT 637 W/ HCPCS: Performed by: INTERNAL MEDICINE

## 2022-05-21 PROCEDURE — G0378 HOSPITAL OBSERVATION PER HR: HCPCS

## 2022-05-21 PROCEDURE — 27000190 HC CPAP FULL FACE MASK W/VALVE

## 2022-05-21 PROCEDURE — 27000221 HC OXYGEN, UP TO 24 HOURS

## 2022-05-21 PROCEDURE — 99900035 HC TECH TIME PER 15 MIN (STAT)

## 2022-05-21 PROCEDURE — 25000242 PHARM REV CODE 250 ALT 637 W/ HCPCS: Performed by: PHYSICIAN ASSISTANT

## 2022-05-21 PROCEDURE — 94640 AIRWAY INHALATION TREATMENT: CPT

## 2022-05-21 PROCEDURE — 94761 N-INVAS EAR/PLS OXIMETRY MLT: CPT

## 2022-05-21 PROCEDURE — 63600175 PHARM REV CODE 636 W HCPCS: Performed by: INTERNAL MEDICINE

## 2022-05-21 PROCEDURE — 11000001 HC ACUTE MED/SURG PRIVATE ROOM

## 2022-05-21 PROCEDURE — 96372 THER/PROPH/DIAG INJ SC/IM: CPT | Performed by: INTERNAL MEDICINE

## 2022-05-21 PROCEDURE — 94660 CPAP INITIATION&MGMT: CPT

## 2022-05-21 RX ORDER — FLUTICASONE PROPIONATE 50 MCG
1 SPRAY, SUSPENSION (ML) NASAL DAILY
Status: DISCONTINUED | OUTPATIENT
Start: 2022-05-21 | End: 2022-05-27 | Stop reason: HOSPADM

## 2022-05-21 RX ORDER — BISACODYL 5 MG
5 TABLET, DELAYED RELEASE (ENTERIC COATED) ORAL DAILY PRN
Status: DISCONTINUED | OUTPATIENT
Start: 2022-05-21 | End: 2022-05-27 | Stop reason: HOSPADM

## 2022-05-21 RX ORDER — ALBUTEROL SULFATE 0.83 MG/ML
2.5 SOLUTION RESPIRATORY (INHALATION) EVERY 6 HOURS PRN
Status: DISCONTINUED | OUTPATIENT
Start: 2022-05-21 | End: 2022-05-21

## 2022-05-21 RX ORDER — ASCORBIC ACID 500 MG
500 TABLET ORAL DAILY
Status: DISCONTINUED | OUTPATIENT
Start: 2022-05-21 | End: 2022-05-27 | Stop reason: HOSPADM

## 2022-05-21 RX ORDER — SODIUM CHLORIDE 0.9 % (FLUSH) 0.9 %
10 SYRINGE (ML) INJECTION
Status: DISCONTINUED | OUTPATIENT
Start: 2022-05-21 | End: 2022-05-27 | Stop reason: HOSPADM

## 2022-05-21 RX ORDER — LAMOTRIGINE 25 MG/1
25 TABLET ORAL DAILY
Status: DISCONTINUED | OUTPATIENT
Start: 2022-05-21 | End: 2022-05-27 | Stop reason: HOSPADM

## 2022-05-21 RX ORDER — ATORVASTATIN CALCIUM 10 MG/1
10 TABLET, FILM COATED ORAL DAILY
Status: DISCONTINUED | OUTPATIENT
Start: 2022-05-21 | End: 2022-05-27 | Stop reason: HOSPADM

## 2022-05-21 RX ORDER — GABAPENTIN 300 MG/1
300 CAPSULE ORAL 3 TIMES DAILY
Status: DISCONTINUED | OUTPATIENT
Start: 2022-05-21 | End: 2022-05-27 | Stop reason: HOSPADM

## 2022-05-21 RX ORDER — BENZONATATE 100 MG/1
100 CAPSULE ORAL 3 TIMES DAILY PRN
Status: DISCONTINUED | OUTPATIENT
Start: 2022-05-21 | End: 2022-05-27 | Stop reason: HOSPADM

## 2022-05-21 RX ORDER — ARIPIPRAZOLE 5 MG/1
5 TABLET ORAL DAILY
Status: DISCONTINUED | OUTPATIENT
Start: 2022-05-21 | End: 2022-05-27 | Stop reason: HOSPADM

## 2022-05-21 RX ORDER — AMLODIPINE BESYLATE 5 MG/1
10 TABLET ORAL DAILY
Status: DISCONTINUED | OUTPATIENT
Start: 2022-05-21 | End: 2022-05-27 | Stop reason: HOSPADM

## 2022-05-21 RX ORDER — MEMANTINE HYDROCHLORIDE 5 MG/1
5 TABLET ORAL 2 TIMES DAILY
Status: DISCONTINUED | OUTPATIENT
Start: 2022-05-21 | End: 2022-05-27 | Stop reason: HOSPADM

## 2022-05-21 RX ADMIN — BENZONATATE 100 MG: 100 CAPSULE ORAL at 04:05

## 2022-05-21 RX ADMIN — METHYLPREDNISOLONE SODIUM SUCCINATE 60 MG: 40 INJECTION, POWDER, FOR SOLUTION INTRAMUSCULAR; INTRAVENOUS at 05:05

## 2022-05-21 RX ADMIN — GABAPENTIN 300 MG: 300 CAPSULE ORAL at 08:05

## 2022-05-21 RX ADMIN — FLUTICASONE PROPIONATE 50 MCG: 50 SPRAY, METERED NASAL at 08:05

## 2022-05-21 RX ADMIN — ARIPIPRAZOLE 5 MG: 5 TABLET ORAL at 08:05

## 2022-05-21 RX ADMIN — AZITHROMYCIN 500 MG: 500 INJECTION, POWDER, LYOPHILIZED, FOR SOLUTION INTRAVENOUS at 04:05

## 2022-05-21 RX ADMIN — IPRATROPIUM BROMIDE AND ALBUTEROL SULFATE 3 ML: 2.5; .5 SOLUTION RESPIRATORY (INHALATION) at 11:05

## 2022-05-21 RX ADMIN — ENOXAPARIN SODIUM 40 MG: 40 INJECTION SUBCUTANEOUS at 04:05

## 2022-05-21 RX ADMIN — MEMANTINE 5 MG: 5 TABLET ORAL at 08:05

## 2022-05-21 RX ADMIN — GABAPENTIN 300 MG: 300 CAPSULE ORAL at 02:05

## 2022-05-21 RX ADMIN — LAMOTRIGINE 25 MG: 25 TABLET ORAL at 08:05

## 2022-05-21 RX ADMIN — OXYCODONE HYDROCHLORIDE AND ACETAMINOPHEN 500 MG: 500 TABLET ORAL at 08:05

## 2022-05-21 RX ADMIN — AMLODIPINE BESYLATE 10 MG: 5 TABLET ORAL at 08:05

## 2022-05-21 RX ADMIN — ATORVASTATIN CALCIUM 10 MG: 10 TABLET, FILM COATED ORAL at 08:05

## 2022-05-21 RX ADMIN — GABAPENTIN 300 MG: 300 CAPSULE ORAL at 09:05

## 2022-05-21 RX ADMIN — METHYLPREDNISOLONE SODIUM SUCCINATE 40 MG: 40 INJECTION, POWDER, FOR SOLUTION INTRAMUSCULAR; INTRAVENOUS at 10:05

## 2022-05-21 RX ADMIN — IPRATROPIUM BROMIDE AND ALBUTEROL SULFATE 3 ML: 2.5; .5 SOLUTION RESPIRATORY (INHALATION) at 04:05

## 2022-05-21 RX ADMIN — MEMANTINE 5 MG: 5 TABLET ORAL at 09:05

## 2022-05-21 RX ADMIN — IPRATROPIUM BROMIDE AND ALBUTEROL SULFATE 3 ML: 2.5; .5 SOLUTION RESPIRATORY (INHALATION) at 08:05

## 2022-05-21 RX ADMIN — METHYLPREDNISOLONE SODIUM SUCCINATE 60 MG: 40 INJECTION, POWDER, FOR SOLUTION INTRAMUSCULAR; INTRAVENOUS at 02:05

## 2022-05-21 RX ADMIN — IPRATROPIUM BROMIDE AND ALBUTEROL SULFATE 3 ML: 2.5; .5 SOLUTION RESPIRATORY (INHALATION) at 12:05

## 2022-05-22 LAB
ANION GAP SERPL CALC-SCNC: 11 MEQ/L
BASOPHILS # BLD AUTO: 0.01 X10(3)/MCL (ref 0–0.2)
BASOPHILS NFR BLD AUTO: 0.1 %
BUN SERPL-MCNC: 22.9 MG/DL (ref 9.8–20.1)
CALCIUM SERPL-MCNC: 9.7 MG/DL (ref 8.4–10.2)
CHLORIDE SERPL-SCNC: 105 MMOL/L (ref 98–107)
CO2 SERPL-SCNC: 24 MMOL/L (ref 22–29)
CREAT SERPL-MCNC: 0.75 MG/DL (ref 0.55–1.02)
CREAT/UREA NIT SERPL: 31
EOSINOPHIL # BLD AUTO: 0 X10(3)/MCL (ref 0–0.9)
EOSINOPHIL NFR BLD AUTO: 0 %
ERYTHROCYTE [DISTWIDTH] IN BLOOD BY AUTOMATED COUNT: 15 % (ref 11.5–17)
GLUCOSE SERPL-MCNC: 135 MG/DL (ref 74–100)
GLUCOSE SERPL-MCNC: 137 MG/DL (ref 70–110)
HCT VFR BLD AUTO: 39.7 % (ref 37–47)
HGB BLD-MCNC: 11.6 GM/DL (ref 12–16)
IMM GRANULOCYTES # BLD AUTO: 0.12 X10(3)/MCL (ref 0–0.02)
IMM GRANULOCYTES NFR BLD AUTO: 1 % (ref 0–0.43)
LYMPHOCYTES # BLD AUTO: 1 X10(3)/MCL (ref 0.6–4.6)
LYMPHOCYTES NFR BLD AUTO: 8.4 %
MCH RBC QN AUTO: 26.6 PG (ref 27–31)
MCHC RBC AUTO-ENTMCNC: 29.2 MG/DL (ref 33–36)
MCV RBC AUTO: 91.1 FL (ref 80–94)
MONOCYTES # BLD AUTO: 0.35 X10(3)/MCL (ref 0.1–1.3)
MONOCYTES NFR BLD AUTO: 2.9 %
NEUTROPHILS # BLD AUTO: 10.5 X10(3)/MCL (ref 2.1–9.2)
NEUTROPHILS NFR BLD AUTO: 87.6 %
NRBC BLD AUTO-RTO: 0 %
PLATELET # BLD AUTO: 276 X10(3)/MCL (ref 130–400)
PMV BLD AUTO: 10 FL (ref 9.4–12.4)
POCT GLUCOSE: 137 MG/DL (ref 70–110)
POCT GLUCOSE: 143 MG/DL (ref 70–110)
POCT GLUCOSE: 150 MG/DL (ref 70–110)
POCT GLUCOSE: 208 MG/DL (ref 70–110)
POTASSIUM SERPL-SCNC: 4.4 MMOL/L (ref 3.5–5.1)
RBC # BLD AUTO: 4.36 X10(6)/MCL (ref 4.2–5.4)
SODIUM SERPL-SCNC: 140 MMOL/L (ref 136–145)
WBC # SPEC AUTO: 12 X10(3)/MCL (ref 4.5–11.5)

## 2022-05-22 PROCEDURE — 36415 COLL VENOUS BLD VENIPUNCTURE: CPT | Performed by: INTERNAL MEDICINE

## 2022-05-22 PROCEDURE — 85025 COMPLETE CBC W/AUTO DIFF WBC: CPT | Performed by: INTERNAL MEDICINE

## 2022-05-22 PROCEDURE — 96372 THER/PROPH/DIAG INJ SC/IM: CPT | Performed by: INTERNAL MEDICINE

## 2022-05-22 PROCEDURE — 80048 BASIC METABOLIC PNL TOTAL CA: CPT | Performed by: INTERNAL MEDICINE

## 2022-05-22 PROCEDURE — 27000221 HC OXYGEN, UP TO 24 HOURS

## 2022-05-22 PROCEDURE — 25000242 PHARM REV CODE 250 ALT 637 W/ HCPCS: Performed by: INTERNAL MEDICINE

## 2022-05-22 PROCEDURE — 99900035 HC TECH TIME PER 15 MIN (STAT)

## 2022-05-22 PROCEDURE — G0378 HOSPITAL OBSERVATION PER HR: HCPCS

## 2022-05-22 PROCEDURE — 11000001 HC ACUTE MED/SURG PRIVATE ROOM

## 2022-05-22 PROCEDURE — 94640 AIRWAY INHALATION TREATMENT: CPT | Mod: XB

## 2022-05-22 PROCEDURE — 94761 N-INVAS EAR/PLS OXIMETRY MLT: CPT

## 2022-05-22 PROCEDURE — 63600175 PHARM REV CODE 636 W HCPCS: Performed by: INTERNAL MEDICINE

## 2022-05-22 PROCEDURE — 94660 CPAP INITIATION&MGMT: CPT

## 2022-05-22 PROCEDURE — 25000003 PHARM REV CODE 250: Performed by: INTERNAL MEDICINE

## 2022-05-22 RX ORDER — FLUTICASONE FUROATE AND VILANTEROL 200; 25 UG/1; UG/1
1 POWDER RESPIRATORY (INHALATION) DAILY
Status: DISCONTINUED | OUTPATIENT
Start: 2022-05-23 | End: 2022-05-27 | Stop reason: HOSPADM

## 2022-05-22 RX ORDER — PREDNISONE 20 MG/1
20 TABLET ORAL 2 TIMES DAILY
Status: DISCONTINUED | OUTPATIENT
Start: 2022-05-23 | End: 2022-05-26

## 2022-05-22 RX ADMIN — AMLODIPINE BESYLATE 10 MG: 5 TABLET ORAL at 08:05

## 2022-05-22 RX ADMIN — LAMOTRIGINE 25 MG: 25 TABLET ORAL at 08:05

## 2022-05-22 RX ADMIN — IPRATROPIUM BROMIDE AND ALBUTEROL SULFATE 3 ML: 2.5; .5 SOLUTION RESPIRATORY (INHALATION) at 12:05

## 2022-05-22 RX ADMIN — IPRATROPIUM BROMIDE AND ALBUTEROL SULFATE 3 ML: 2.5; .5 SOLUTION RESPIRATORY (INHALATION) at 08:05

## 2022-05-22 RX ADMIN — MEMANTINE 5 MG: 5 TABLET ORAL at 08:05

## 2022-05-22 RX ADMIN — OXYCODONE HYDROCHLORIDE AND ACETAMINOPHEN 500 MG: 500 TABLET ORAL at 08:05

## 2022-05-22 RX ADMIN — IPRATROPIUM BROMIDE AND ALBUTEROL SULFATE 3 ML: 2.5; .5 SOLUTION RESPIRATORY (INHALATION) at 04:05

## 2022-05-22 RX ADMIN — IPRATROPIUM BROMIDE AND ALBUTEROL SULFATE 3 ML: 2.5; .5 SOLUTION RESPIRATORY (INHALATION) at 09:05

## 2022-05-22 RX ADMIN — IPRATROPIUM BROMIDE AND ALBUTEROL SULFATE 3 ML: 2.5; .5 SOLUTION RESPIRATORY (INHALATION) at 03:05

## 2022-05-22 RX ADMIN — METHYLPREDNISOLONE SODIUM SUCCINATE 40 MG: 40 INJECTION, POWDER, FOR SOLUTION INTRAMUSCULAR; INTRAVENOUS at 08:05

## 2022-05-22 RX ADMIN — ENOXAPARIN SODIUM 40 MG: 40 INJECTION SUBCUTANEOUS at 06:05

## 2022-05-22 RX ADMIN — GABAPENTIN 300 MG: 300 CAPSULE ORAL at 08:05

## 2022-05-22 RX ADMIN — GABAPENTIN 300 MG: 300 CAPSULE ORAL at 03:05

## 2022-05-22 RX ADMIN — FLUTICASONE PROPIONATE 50 MCG: 50 SPRAY, METERED NASAL at 08:05

## 2022-05-22 RX ADMIN — ARIPIPRAZOLE 5 MG: 5 TABLET ORAL at 08:05

## 2022-05-22 RX ADMIN — BENZONATATE 100 MG: 100 CAPSULE ORAL at 03:05

## 2022-05-22 RX ADMIN — ATORVASTATIN CALCIUM 10 MG: 10 TABLET, FILM COATED ORAL at 08:05

## 2022-05-22 NOTE — ASSESSMENT & PLAN NOTE
1-azithromycin 500 mgs iv daily  2-nc at 2 liters during day and BiPAP at night  3- solumedrol 80 mg IV q.8 hours a, wean down as patient improves  4- marino

## 2022-05-22 NOTE — HPI
The patient is a 51-year-old white female with history of tobacco abuse for many years.  She was walking at 1 time 2 packs per day and she quit 3 years ago after sustaining a cerebral vascular accident.  Since then she has been bed-bound, she lives with family members and she gets sitters.  She has history of COPD as well as multiple seasonal allergies.  Patient is on no home oxygen.  Patient refers feeling short of breath and decided to come to the emergency room for evaluation where she was found to be hypoxemic and hypercapnic.  Patient has been admitted for COPD exacerbation with acute hypoxic/hypercapnic respiratory failure.  Even though she refers she has COPD, her COPD has never been quantified.  She has history of sleep apnea and uses CPAP.  Eventually once she is better will discontinue oxygen if your out if she is hypoxic to less than 88%.  Also once she is better at baseline will do ABGs just see if she is hypercapnic/hypoxic and compensated requiring about her machine that would be trilogy to improve ventilation.  The patient was admitted with [place] tri mycin IV/nebulized treatment as well as Solu-Medrol IV.  Will contact respiratory for of CPAP at night.  At the present moment she is on 2 L nasal cannula with O2 sat around 94%.  Patient is morbidly obese and may have a component of obesity hypoventilation syndrome as well.

## 2022-05-22 NOTE — SUBJECTIVE & OBJECTIVE
Past Medical History:   Diagnosis Date    COPD exacerbation 05/21/2022    Hypertension     Sleep apnea     Stroke        History reviewed. No pertinent surgical history.    Review of patient's allergies indicates:   Allergen Reactions    Sulfamethoxazole-trimethoprim Hives and Other (See Comments)     Other reaction(s): Other (See Comments)       No current facility-administered medications on file prior to encounter.     Current Outpatient Medications on File Prior to Encounter   Medication Sig    albuterol (PROVENTIL) 5 mg/mL nebulizer solution Take 2.5 mg by nebulization every 6 (six) hours as needed for Wheezing. Rescue    alendronate (FOSAMAX) 70 MG tablet Take 70 mg by mouth every 7 days.    amLODIPine (NORVASC) 10 MG tablet Take 10 mg by mouth once daily.    ARIPiprazole (ABILIFY) 5 MG Tab Take 5 mg by mouth once daily.    ascorbic acid, vitamin C, (VITAMIN C) 500 MG tablet Take 500 mg by mouth once daily.    atorvastatin (LIPITOR) 10 MG tablet Take 10 mg by mouth once daily.    bisacodyL (DULCOLAX) 5 mg EC tablet Take 5 mg by mouth daily as needed for Constipation.    calcium carbonate (OS-GARFIELD) 600 mg calcium (1,500 mg) Tab Take 600 mg by mouth once.    fluticasone propionate (FLONASE) 50 mcg/actuation nasal spray 1 spray by Each Nostril route once daily.    gabapentin (NEURONTIN) 300 MG capsule Take 300 mg by mouth 3 (three) times daily.    lamoTRIgine (LAMICTAL) 25 MG tablet Take 25 mg by mouth once daily.    memantine (NAMENDA) 10 MG Tab Take 5 mg by mouth 2 (two) times daily.    nitrofurantoin, macrocrystal-monohydrate, (MACROBID) 100 MG capsule Take 100 mg by mouth every 12 (twelve) hours.    pantoprazole (PROTONIX) 40 MG tablet Take 40 mg by mouth once daily.     Family History       Family history is unknown by patient.          Tobacco Use    Smoking status: Former Smoker     Packs/day: 2.00     Types: Cigarettes     Quit date: 2019     Years since quitting: 3.3    Smokeless tobacco: Former User      Quit date: 2019    Tobacco comment: quit smoking 3 yrs ago   Substance and Sexual Activity    Alcohol use: Never    Drug use: Never    Sexual activity: Not Currently     Review of Systems   Constitutional:  Positive for fatigue.   HENT:  Positive for congestion, rhinorrhea and sneezing.    Eyes: Negative.    Respiratory:  Positive for shortness of breath and wheezing.    Cardiovascular:  Positive for leg swelling.   Gastrointestinal: Negative.    Endocrine: Negative.    Genitourinary: Negative.    Musculoskeletal:  Positive for arthralgias.   Skin: Negative.    Allergic/Immunologic: Positive for environmental allergies.   Neurological:  Positive for weakness.   Hematological: Negative.    Psychiatric/Behavioral: Negative.     All other systems reviewed and are negative.  Objective:     Vital Signs (Most Recent):  Temp: 97.7 °F (36.5 °C) (05/21/22 2210)  Pulse: 83 (05/21/22 2210)  Resp: 17 (05/21/22 2034)  BP: 120/78 (05/21/22 2210)  SpO2: (!) 93 % (05/21/22 2210)   Vital Signs (24h Range):  Temp:  [96.4 °F (35.8 °C)-98.1 °F (36.7 °C)] 97.7 °F (36.5 °C)  Pulse:  [53-89] 83  Resp:  [17-18] 17  SpO2:  [93 %-97 %] 93 %  BP: (115-143)/(77-95) 120/78     Weight: 129.8 kg (286 lb 2.5 oz)  Body mass index is 44.82 kg/m².    Physical Exam  Constitutional:       Appearance: She is obese.   HENT:      Head: Normocephalic and atraumatic.      Right Ear: Ear canal normal.      Left Ear: Ear canal normal.      Nose: Congestion present.      Mouth/Throat:      Mouth: Mucous membranes are moist.      Pharynx: Oropharynx is clear.   Eyes:      Extraocular Movements: Extraocular movements intact and EOM normal.      Conjunctiva/sclera: Conjunctivae normal.      Pupils: Pupils are equal, round, and reactive to light.   Cardiovascular:      Rate and Rhythm: Normal rate and regular rhythm.   Pulmonary:      Effort: Respiratory distress present.      Breath sounds: Wheezing present.   Abdominal:      General: Abdomen is flat. Bowel  sounds are normal.      Palpations: Abdomen is soft.   Musculoskeletal:         General: Swelling present.      Cervical back: Normal range of motion and neck supple.      Right lower leg: Edema present.      Left lower leg: Edema present.   Skin:     General: Skin is warm and dry.   Neurological:      Mental Status: She is alert and oriented to person, place, and time. Mental status is at baseline.      Motor: Weakness present.   Psychiatric:         Mood and Affect: Mood normal.         Judgment: Judgment normal.         CRANIAL NERVES     CN I  normal olfaction    CN II   Visual fields full to confrontation.   Visual acuity: normal    CN III, IV, VI   Pupils are equal, round, and reactive to light.  Extraocular motions are normal.   Right pupil: Size: 4 mm. Shape: regular. Reactivity: brisk. Consensual response: intact. Accommodation: intact.   Left pupil: Size: 4 mm. Shape: regular. Reactivity: brisk. Consensual response: intact. Accommodation: intact.   CN III: no CN III palsy  CN VI: no CN VI palsy  Nystagmus: none   Diplopia: none  Ophthalmoparesis: none  Upgaze: normal  Downgaze: normal    CN V   Facial sensation intact.   Right facial sensation deficit: none  Left facial sensation deficit: none  Right corneal reflex: normal  Left corneal reflex: normal  Jaw jerk: normal    CN VII   Facial expression full, symmetric.   Right facial weakness: none  Left facial weakness: none  Right taste: normal  Left taste: normal    CN VIII   CN VIII normal.   Hearing: intact    CN IX, X   CN IX normal.   CN X normal.   Palate: symmetric  Right gag reflex: normal  Left gag reflex: normal    CN XI   Right sternocleidomastoid strength: normal  Left sternocleidomastoid strength: weak  Right trapezius strength: normal  Left trapezius strength: weak    CN XII   CN XII normal.   Fasciculations: absent       Pt w hx of cva w left hemiparesis. Also has hx of morbid obesity and is bed bound      Significant Labs: All pertinent labs  within the past 24 hours have been reviewed.  ABGs: No results for input(s): PH, PCO2, HCO3, POCSATURATED, BE, TOTALHB, COHB, METHB, O2HB, POCFIO2, PO2 in the last 48 hours.  CBC:   Recent Labs   Lab 05/20/22  1134   WBC 6.7   HGB 11.5*   HCT 38.0        CMP:   Recent Labs   Lab 05/20/22  1134      K 4.2   CO2 30*   BUN 11.6   CREATININE 0.74   CALCIUM 10.2   ALBUMIN 3.4*   BILITOT 0.3   ALKPHOS 74   AST 15   ALT 17   EGFRNONAA >60     Troponin:   Recent Labs   Lab 05/20/22  1134   TROPONINI <0.010       Significant Imaging: I have reviewed all pertinent imaging results/findings within the past 24 hours.

## 2022-05-22 NOTE — H&P
[facility] - [facility] Medicine  History & Physical    Patient Name: Livia Kennedy  MRN: [id number]  Patient Class: IP- Inpatient  Admission Date: 5/20/2022  Attending Physician: shira White MD  Primary Care Provider: APRIL GRAHAM NP         Patient information was obtained from patient and ER records.     Subjective:     Principal Problem:COPD exacerbation    Chief Complaint:   Chief Complaint   Patient presents with    Shortness of Breath     Pt to ED via [facility] for SOB since yesterday AM. PMH includes COPD. Per EMS, RA O2 sat 88-89%, improved with 2LPM NC, duoneb and solumedrol.           HPI: The patient is a 51-year-old white female with history of tobacco abuse for many years.  She was walking at 1 time 2 packs per day and she quit 3 years ago after sustaining a cerebral vascular accident.  Since then she has been bed-bound, she lives with family members and she gets sitters.  She has history of COPD as well as multiple seasonal allergies.  Patient is on no home oxygen.  Patient refers feeling short of breath and decided to come to the emergency room for evaluation where she was found to be hypoxemic and hypercapnic.  Patient has been admitted for COPD exacerbation with acute hypoxic/hypercapnic respiratory failure.  Even though she refers she has COPD, her COPD has never been quantified.  She has history of sleep apnea and uses CPAP.  Eventually once she is better will discontinue oxygen if your out if she is hypoxic to less than 88%.  Also once she is better at baseline will do ABGs just see if she is hypercapnic/hypoxic and compensated requiring about her machine that would be trilogy to improve ventilation.  The patient was admitted with [place] tri mycin IV/nebulized treatment as well as Solu-Medrol IV.  Will contact respiratory for of CPAP at night.  At the present moment she is on 2 L nasal cannula with O2 sat around 94%.  Patient is morbidly obese and may have a  component of obesity hypoventilation syndrome as well.      Past Medical History:   Diagnosis Date    COPD exacerbation 05/21/2022    Hypertension     Sleep apnea     Stroke        History reviewed. No pertinent surgical history.    Review of patient's allergies indicates:   Allergen Reactions    Sulfamethoxazole-trimethoprim Hives and Other (See Comments)     Other reaction(s): Other (See Comments)       No current facility-administered medications on file prior to encounter.     Current Outpatient Medications on File Prior to Encounter   Medication Sig    albuterol (PROVENTIL) 5 mg/mL nebulizer solution Take 2.5 mg by nebulization every 6 (six) hours as needed for Wheezing. Rescue    alendronate (FOSAMAX) 70 MG tablet Take 70 mg by mouth every 7 days.    amLODIPine (NORVASC) 10 MG tablet Take 10 mg by mouth once daily.    ARIPiprazole (ABILIFY) 5 MG Tab Take 5 mg by mouth once daily.    ascorbic acid, vitamin C, (VITAMIN C) 500 MG tablet Take 500 mg by mouth once daily.    atorvastatin (LIPITOR) 10 MG tablet Take 10 mg by mouth once daily.    bisacodyL (DULCOLAX) 5 mg EC tablet Take 5 mg by mouth daily as needed for Constipation.    calcium carbonate (OS-GARFIELD) 600 mg calcium (1,500 mg) Tab Take 600 mg by mouth once.    fluticasone propionate (FLONASE) 50 mcg/actuation nasal spray 1 spray by Each Nostril route once daily.    gabapentin (NEURONTIN) 300 MG capsule Take 300 mg by mouth 3 (three) times daily.    lamoTRIgine (LAMICTAL) 25 MG tablet Take 25 mg by mouth once daily.    memantine (NAMENDA) 10 MG Tab Take 5 mg by mouth 2 (two) times daily.    nitrofurantoin, macrocrystal-monohydrate, (MACROBID) 100 MG capsule Take 100 mg by mouth every 12 (twelve) hours.    pantoprazole (PROTONIX) 40 MG tablet Take 40 mg by mouth once daily.     Family History       Family history is unknown by patient.          Tobacco Use    Smoking status: Former Smoker     Packs/day: 2.00     Types: Cigarettes     Quit  date: 2019     Years since quitting: 3.3    Smokeless tobacco: Former User     Quit date: 2019    Tobacco comment: quit smoking 3 yrs ago   Substance and Sexual Activity    Alcohol use: Never    Drug use: Never    Sexual activity: Not Currently     Review of Systems   Constitutional:  Positive for fatigue.   HENT:  Positive for congestion, rhinorrhea and sneezing.    Eyes: Negative.    Respiratory:  Positive for shortness of breath and wheezing.    Cardiovascular:  Positive for leg swelling.   Gastrointestinal: Negative.    Endocrine: Negative.    Genitourinary: Negative.    Musculoskeletal:  Positive for arthralgias.   Skin: Negative.    Allergic/Immunologic: Positive for environmental allergies.   Neurological:  Positive for weakness.   Hematological: Negative.    Psychiatric/Behavioral: Negative.     All other systems reviewed and are negative.  Objective:     Vital Signs (Most Recent):  Temp: 97.7 °F (36.5 °C) (05/21/22 2210)  Pulse: 83 (05/21/22 2210)  Resp: 17 (05/21/22 2034)  BP: 120/78 (05/21/22 2210)  SpO2: (!) 93 % (05/21/22 2210)   Vital Signs (24h Range):  Temp:  [96.4 °F (35.8 °C)-98.1 °F (36.7 °C)] 97.7 °F (36.5 °C)  Pulse:  [53-89] 83  Resp:  [17-18] 17  SpO2:  [93 %-97 %] 93 %  BP: (115-143)/(77-95) 120/78     Weight: 129.8 kg (286 lb 2.5 oz)  Body mass index is 44.82 kg/m².    Physical Exam  Constitutional:       Appearance: She is obese.   HENT:      Head: Normocephalic and atraumatic.      Right Ear: Ear canal normal.      Left Ear: Ear canal normal.      Nose: Congestion present.      Mouth/Throat:      Mouth: Mucous membranes are moist.      Pharynx: Oropharynx is clear.   Eyes:      Extraocular Movements: Extraocular movements intact and EOM normal.      Conjunctiva/sclera: Conjunctivae normal.      Pupils: Pupils are equal, round, and reactive to light.   Cardiovascular:      Rate and Rhythm: Normal rate and regular rhythm.   Pulmonary:      Effort: Respiratory distress present.       Breath sounds: Wheezing present.   Abdominal:      General: Abdomen is flat. Bowel sounds are normal.      Palpations: Abdomen is soft.   Musculoskeletal:         General: Swelling present.      Cervical back: Normal range of motion and neck supple.      Right lower leg: Edema present.      Left lower leg: Edema present.   Skin:     General: Skin is warm and dry.   Neurological:      Mental Status: She is alert and oriented to person, place, and time. Mental status is at baseline.      Motor: Weakness present.   Psychiatric:         Mood and Affect: Mood normal.         Judgment: Judgment normal.         CRANIAL NERVES     CN I  normal olfaction    CN II   Visual fields full to confrontation.   Visual acuity: normal    CN III, IV, VI   Pupils are equal, round, and reactive to light.  Extraocular motions are normal.   Right pupil: Size: 4 mm. Shape: regular. Reactivity: brisk. Consensual response: intact. Accommodation: intact.   Left pupil: Size: 4 mm. Shape: regular. Reactivity: brisk. Consensual response: intact. Accommodation: intact.   CN III: no CN III palsy  CN VI: no CN VI palsy  Nystagmus: none   Diplopia: none  Ophthalmoparesis: none  Upgaze: normal  Downgaze: normal    CN V   Facial sensation intact.   Right facial sensation deficit: none  Left facial sensation deficit: none  Right corneal reflex: normal  Left corneal reflex: normal  Jaw jerk: normal    CN VII   Facial expression full, symmetric.   Right facial weakness: none  Left facial weakness: none  Right taste: normal  Left taste: normal    CN VIII   CN VIII normal.   Hearing: intact    CN IX, X   CN IX normal.   CN X normal.   Palate: symmetric  Right gag reflex: normal  Left gag reflex: normal    CN XI   Right sternocleidomastoid strength: normal  Left sternocleidomastoid strength: weak  Right trapezius strength: normal  Left trapezius strength: weak    CN XII   CN XII normal.   Fasciculations: absent       Pt w hx of cva w left hemiparesis. Also  has hx of morbid obesity and is bed bound      Significant Labs: All pertinent labs within the past 24 hours have been reviewed.  ABGs: No results for input(s): PH, PCO2, HCO3, POCSATURATED, BE, TOTALHB, COHB, METHB, O2HB, POCFIO2, PO2 in the last 48 hours.  CBC:   Recent Labs   Lab 05/20/22  1134   WBC 6.7   HGB 11.5*   HCT 38.0        CMP:   Recent Labs   Lab 05/20/22  1134      K 4.2   CO2 30*   BUN 11.6   CREATININE 0.74   CALCIUM 10.2   ALBUMIN 3.4*   BILITOT 0.3   ALKPHOS 74   AST 15   ALT 17   EGFRNONAA >60     Troponin:   Recent Labs   Lab 05/20/22  1134   TROPONINI <0.010       Significant Imaging: I have reviewed all pertinent imaging results/findings within the past 24 hours.    Assessment/Plan:     * COPD exacerbation  1-azithromycin 500 mgs iv daily  2-nc at 2 liters during day and BiPAP at night  3- solumedrol 80 mg IV q.8 hours a, wean down as patient improves  4- duonebs      Acute respiratory failure with hypoxia and hypercarbia  1-BiPAP  2- keep O2 sat 88-92%  3. -patient with history of COPD but  unquantified, she has never had PFTs.  Once the patient is at baseline we will draw ABGs  to see if she is hypoxic/hypercapnic and compensated.  If she is we will attempt to get her trilogy with help from Pulmonary.  The patient might require PFTs to assess severity of a COPD/staging      VTE Risk Mitigation (From admission, onward)         Ordered     enoxaparin injection 40 mg  Daily         05/20/22 1718     IP VTE HIGH RISK PATIENT  Once         05/20/22 1718                   Mansoor White MD  Department of Hospital Medicine   [facility] - [facility]

## 2022-05-22 NOTE — ASSESSMENT & PLAN NOTE
1-BiPAP  2- keep O2 sat 88-92%  3. -patient with history of COPD but  unquantified, she has never had PFTs.  Once the patient is at baseline we will draw ABGs  to see if she is hypoxic/hypercapnic and compensated.  If she is we will attempt to get her trilogy with help from Pulmonary.  The patient might require PFTs to assess severity of a COPD/staging

## 2022-05-23 LAB
BASE EXCESS ARTERIAL: NORMAL
CORRECTED TEMPERATURE (PCO2): 48 MMHG (ref 35–45)
CORRECTED TEMPERATURE (PH): 7.42 (ref 7.35–7.45)
CORRECTED TEMPERATURE (PO2): 59 MMHG (ref 80–100)
GLUCOSE SERPL-MCNC: 116 MG/DL (ref 70–110)
HCO3 ARTERIAL: NORMAL
HCO3 UR-SCNC: 31.1 MMOL/L
HGB BLD-MCNC: 11.7 G/DL (ref 12–16)
HGB BLD-MCNC: NORMAL G/DL
PCO2 BLDA: 48 MMHG (ref 35–45)
PCO2 BLDA: NORMAL MM[HG]
PCO2 BLDA: NORMAL MM[HG]
PH SMN: 7.42 [PH] (ref 7.35–7.45)
PH SMN: NORMAL [PH]
PO2 BLDA: 59 MMHG (ref 80–100)
PO2 BLDA: NORMAL MM[HG]
POC BASE DEFICIT: 5.7 MMOL/L (ref -2–2)
POC COHB: 1.2 %
POC COHB: NORMAL
POC IONIZED CALCIUM: 1.24 MMOL/L (ref 1.12–1.23)
POC IONIZED CALCIUM: NORMAL
POC METHB: 0.5 % (ref 0.4–2)
POC METHB: NORMAL
POC O2HB: 91.1 % (ref 94–97)
POC O2HB: NORMAL
POC SATURATED O2: 90.7 %
POC TEMPERATURE: 37 °C
POCT GLUCOSE: 117 MG/DL (ref 70–110)
POCT GLUCOSE: 145 MG/DL (ref 70–110)
POCT GLUCOSE: 94 MG/DL (ref 70–110)
POTASSIUM BLD-SCNC: 3.6 MMOL/L (ref 3.5–5)
POTASSIUM BLD-SCNC: NORMAL MMOL/L
SATURATED O2 ARTERIAL, I-STAT: NORMAL
SODIUM BLD-SCNC: 139 MMOL/L (ref 137–145)
SODIUM BLD-SCNC: NORMAL MMOL/L
SPECIMEN SOURCE: ABNORMAL

## 2022-05-23 PROCEDURE — 97530 THERAPEUTIC ACTIVITIES: CPT

## 2022-05-23 PROCEDURE — 94640 AIRWAY INHALATION TREATMENT: CPT

## 2022-05-23 PROCEDURE — G0378 HOSPITAL OBSERVATION PER HR: HCPCS

## 2022-05-23 PROCEDURE — 99900035 HC TECH TIME PER 15 MIN (STAT)

## 2022-05-23 PROCEDURE — 25000242 PHARM REV CODE 250 ALT 637 W/ HCPCS: Performed by: INTERNAL MEDICINE

## 2022-05-23 PROCEDURE — 82803 BLOOD GASES ANY COMBINATION: CPT

## 2022-05-23 PROCEDURE — 63600175 PHARM REV CODE 636 W HCPCS: Performed by: INTERNAL MEDICINE

## 2022-05-23 PROCEDURE — 11000001 HC ACUTE MED/SURG PRIVATE ROOM

## 2022-05-23 PROCEDURE — 25000003 PHARM REV CODE 250: Performed by: INTERNAL MEDICINE

## 2022-05-23 PROCEDURE — 96372 THER/PROPH/DIAG INJ SC/IM: CPT | Performed by: INTERNAL MEDICINE

## 2022-05-23 PROCEDURE — 94761 N-INVAS EAR/PLS OXIMETRY MLT: CPT

## 2022-05-23 PROCEDURE — 94660 CPAP INITIATION&MGMT: CPT

## 2022-05-23 PROCEDURE — 97163 PT EVAL HIGH COMPLEX 45 MIN: CPT

## 2022-05-23 PROCEDURE — 36600 WITHDRAWAL OF ARTERIAL BLOOD: CPT

## 2022-05-23 PROCEDURE — 27000221 HC OXYGEN, UP TO 24 HOURS

## 2022-05-23 PROCEDURE — 97167 OT EVAL HIGH COMPLEX 60 MIN: CPT

## 2022-05-23 RX ORDER — MICONAZOLE NITRATE 2 %
POWDER (GRAM) TOPICAL 2 TIMES DAILY
Status: DISCONTINUED | OUTPATIENT
Start: 2022-05-23 | End: 2022-05-27 | Stop reason: HOSPADM

## 2022-05-23 RX ADMIN — FLUTICASONE FUROATE AND VILANTEROL TRIFENATATE 1 PUFF: 200; 25 POWDER RESPIRATORY (INHALATION) at 11:05

## 2022-05-23 RX ADMIN — BENZONATATE 100 MG: 100 CAPSULE ORAL at 11:05

## 2022-05-23 RX ADMIN — FLUTICASONE PROPIONATE 50 MCG: 50 SPRAY, METERED NASAL at 09:05

## 2022-05-23 RX ADMIN — MEMANTINE 5 MG: 5 TABLET ORAL at 09:05

## 2022-05-23 RX ADMIN — ATORVASTATIN CALCIUM 10 MG: 10 TABLET, FILM COATED ORAL at 09:05

## 2022-05-23 RX ADMIN — GABAPENTIN 300 MG: 300 CAPSULE ORAL at 08:05

## 2022-05-23 RX ADMIN — PREDNISONE 20 MG: 20 TABLET ORAL at 09:05

## 2022-05-23 RX ADMIN — GABAPENTIN 300 MG: 300 CAPSULE ORAL at 02:05

## 2022-05-23 RX ADMIN — IPRATROPIUM BROMIDE AND ALBUTEROL SULFATE 3 ML: 2.5; .5 SOLUTION RESPIRATORY (INHALATION) at 07:05

## 2022-05-23 RX ADMIN — MICONAZOLE NITRATE 2 % TOPICAL POWDER: at 08:05

## 2022-05-23 RX ADMIN — LAMOTRIGINE 25 MG: 25 TABLET ORAL at 09:05

## 2022-05-23 RX ADMIN — IPRATROPIUM BROMIDE AND ALBUTEROL SULFATE 3 ML: 2.5; .5 SOLUTION RESPIRATORY (INHALATION) at 03:05

## 2022-05-23 RX ADMIN — ENOXAPARIN SODIUM 40 MG: 40 INJECTION SUBCUTANEOUS at 04:05

## 2022-05-23 RX ADMIN — ARIPIPRAZOLE 5 MG: 5 TABLET ORAL at 09:05

## 2022-05-23 RX ADMIN — IPRATROPIUM BROMIDE AND ALBUTEROL SULFATE 3 ML: 2.5; .5 SOLUTION RESPIRATORY (INHALATION) at 01:05

## 2022-05-23 RX ADMIN — AMLODIPINE BESYLATE 10 MG: 5 TABLET ORAL at 09:05

## 2022-05-23 RX ADMIN — PREDNISONE 20 MG: 20 TABLET ORAL at 08:05

## 2022-05-23 RX ADMIN — GABAPENTIN 300 MG: 300 CAPSULE ORAL at 09:05

## 2022-05-23 RX ADMIN — IPRATROPIUM BROMIDE AND ALBUTEROL SULFATE 3 ML: 2.5; .5 SOLUTION RESPIRATORY (INHALATION) at 12:05

## 2022-05-23 RX ADMIN — IPRATROPIUM BROMIDE AND ALBUTEROL SULFATE 3 ML: 2.5; .5 SOLUTION RESPIRATORY (INHALATION) at 04:05

## 2022-05-23 RX ADMIN — OXYCODONE HYDROCHLORIDE AND ACETAMINOPHEN 500 MG: 500 TABLET ORAL at 09:05

## 2022-05-23 RX ADMIN — MEMANTINE 5 MG: 5 TABLET ORAL at 08:05

## 2022-05-23 NOTE — PLAN OF CARE
Problem: Occupational Therapy  Goal: Occupational Therapy Goal  Description: LTG: Caregiver will be able to safely assist pt with all ADL/transfers with good safety and no falls by d/c.    STG: to be met in 2 weeks, by 6/6  1. Pt will perform UB dressing with Min A.  2. Pt will perform LB with Mod A.  3. Pt will perform grooming in standing at sink >3 min with CGA using ellis-walker.  4. Pt will perform bsc t/f with min A using ellis-walker.  5. Pt will perform toileting with min A using bsc and ellis-walker.  6. Pt will perform tub t/f using HW and TTB.  Outcome: Ongoing, Progressing

## 2022-05-23 NOTE — PLAN OF CARE
05/23/22 1551   Discharge Reassessment   Assessment Type Discharge Planning Reassessment   Discharge Plan A Rehab   Post-Acute Status   Post-Acute Authorization Placement   Post-Acute Placement Status Referrals Sent  ([facility])

## 2022-05-23 NOTE — PT/OT/SLP EVAL
Physical Therapy Evaluation    Patient Name:  Livia Kennedy   MRN:  [id number]    Recommendations:     Discharge Recommendations:  nursing facility, skilled, rehabilitation facility   Discharge Equipment Recommendations:  (TBD)   Barriers to discharge: Inaccessible home and Increased need of assistance    Assessment:     Livia Kennedy is a 51 y.o. female admitted with a medical diagnosis of COPD exacerbation. Patient with history of CVA in 2019 with residual left-sided weakness. She lives in single level home with spouse. Has caregivers 6 days a week that provide care for 7 hours each day. She spends majority of day in lift chair. Transfers to /[facility] using a Apoorva Steady (sit-to-stand aid). She reports one recent fall when caregiver was transferring her to vehicle.  At time of PT evaluation, she presents with the following impairments/functional limitations:  weakness, impaired endurance, impaired self care skills, gait instability, impaired functional mobilty, impaired balance, decreased coordination, decreased upper extremity function, decreased lower extremity function, decreased safety awareness, abnormal tone, impaired coordination, impaired fine motor, impaired cardiopulmonary response to activity. She required MOD A - MAX A for bed mobility. Sitting balance was initially fair, but progressed to poor after sitting for ~10 minutes. Stood with MOD A - MAX A. Left knee seemed to be hyperextended. Did fair job weight shifting. Able to weight shift to LLE to laterally step with RLE. Required assistance with laterally stepping with LLE. No buckling in LE's noted while standing. Patient appears to be very motivated. I do feel she would benefit from rehab placement to improve strength and overall mobility. She will be seen daily by PT while hospitalized. Progress patient as tolerated.     Rehab Prognosis: Good; patient would benefit from acute skilled PT services to address these deficits and reach maximum level of  function.    Recent Surgery: * No surgery found *      Plan:     During this hospitalization, patient to be seen daily to address the identified rehab impairments via gait training, therapeutic activities, therapeutic exercises, neuromuscular re-education and progress toward the following goals:    · Plan of Care Expires:  06/13/22    Subjective     Chief Complaint: none  Patient/Family Comments/goals:   Pain/Comfort:  · Pain Rating 1: 0/10    Patients cultural, spiritual, Nondenominational conflicts given the current situation: no    Living Environment: She lives in single level home with spouse. Has caregivers 6 days a week that provide care for 7 hours each day. She spends majority of day in lift chair. Transfers to WC/commode using a Apoorva Steady (sit-to-stand aid).    Equipment used at home: wheelchair, walker, ellis, lift device, bedside commode (Apoorva steady (sit-to-stand aid)). Upon discharge, patient will have assistance from spouse & caregivers.    Objective:     Communicated with nurse prior to session.  Patient found HOB elevated with telemetry, PureWick  upon PT entry to room.    General Precautions: Standard, fall   Orthopedic Precautions:N/A   Braces: N/A  Respiratory Status: Room air. Sats 88%-95% during session.    Exams:  · Cognitive Exam:  Patient is oriented to Person, Place, Time and Situation  · Sensation:    · -       Intact  · RLE ROM: WFL  · RLE Strength: 4/5 grossly  · LLE ROM: WFL  · LLE Strength: Deficits: Hip Flex: -2/5. Knee Flex: -2/5. Knee Ext: -3/5. Ankle PF/DF: 0/5    Functional Mobility:  · Bed Mobility:     · Supine to Sit: moderate assistance and maximal assistance  · Sit to Supine: moderate assistance and maximal assistance  · Transfers:     · Sit to Stand:  moderate assistance and maximal assistance with hand-held assist  · Gait: 3 side-steps left with MOD A - MAX A. Required assistance with laterally moving LLE.   · Sitting and Standing Balance: Poor    Therapeutic Activities and  Exercises:   Sitting Balance: SBA that progressed to MIN A after ~10 minutes. Posterior lean. Constant cueing for upright posture.     AM-PAC 6 CLICK MOBILITY  Total Score:11     Patient left HOB elevated with all lines intact, call button in reach, bed alarm on and caregiver present.    GOALS:   Multidisciplinary Problems     Physical Therapy Goals        Problem: Physical Therapy    Goal Priority Disciplines Outcome Goal Variances Interventions   Physical Therapy Goal     PT, PT/OT Ongoing, Progressing     Description: Goals to be met by: 22     Patient will increase functional independence with mobility by performin. Supine to sit with MInimal Assistance  2. Sit to supine with MInimal Assistance  3. Sit to stand transfer with Minimal Assistance  4. Bed to chair transfer with Moderate Assistance using Wiliam-walker  5. Gait  x 15 feet with Moderate Assistance using Wiliam-walker.   6. Wheelchair propulsion x100 feet with Stand-by Assistance using RUE & RLE  7. Stand for 3 minutes with Minimal Assistance using Wiliam-walker                     History:     Past Medical History:   Diagnosis Date    COPD exacerbation 2022    Hypertension     Sleep apnea     Stroke        History reviewed. No pertinent surgical history.    Time Tracking:     PT Received On: 22  PT Start Time: 845     PT Stop Time: 928  PT Total Time (min): 43 min     Billable Minutes: Evaluation 28 minutes and Therapeutic Activity 15 minutes      2022

## 2022-05-23 NOTE — PROGRESS NOTES
[facility] Medicine Progress Note        Chief Complaint: Inpatient Follow-up for  Copd exacerbation/ acute hypoxic hypercapnic resp. failure    HPI: The patient is a 51-year-old white female with history of tobacco abuse for many years. She was smoking  at one time 2 packs per day and she quit 3 years ago after sustaining a cerebral vascular accident. Since then she has been bed-bound, she lives with family members and she gets sitters. She has history of unquantified COPD as well as multiple seasonal allergies. Patient is on no home oxygen. Patient refers feeling short of breath and decided to come to the emergency room for evaluation where she was found to be hypoxemic and hypercapnic. Patient has been admitted for COPD exacerbation with acute hypoxic/hypercapnic respiratory failure. Even though she refers she has COPD, her COPD has never been quantified. She has history of sleep apnea and uses CPAP. Eventually once she is better will discontinue oxygen and see if she gets  hypoxic to less than 88%. Also once she is better at baseline will do ABGs just see if she is hypercapnic/hypoxic and compensated to see if she qualifies for  trilogy to improve ventilation. The patient was admitted with azithromycin iv /nebulized treatment as well as Solu-Medrol IV. Will contact respiratory for of CPAP at night. At the present moment she is on 2 L nasal cannula with O2 sat around 94%. Patient is morbidly obese and may have a component of obesity hypoventilation syndrome as well.      Interval Hx:     Azithromycin has been dc, solumedrol iv weaned down. Pt is using cpap w/o issues and today looks great. Will transfer solumedrol to po. Get abg's in am.  Pt voices no complains. Plans for home health w pt upon discharge      Objective/physical exam:  General: In no acute distress, afebrile. Morbidly obese female in no distress  Chest: Clear to auscultation bilaterally  Heart: RRR, +S1, S2,  no appreciable murmur  Abdomen: Soft, nontender, BS +  MSK: Warm, no lower extremity edema, no clubbing or cyanosis  Neurologic: Alert and oriented x4, Cranial nerve II-XII intact, left hemiparesis/unable to reposition herself in bed    VITAL SIGNS: 24 HRS MIN & MAX LAST   Temp  Min: 96.8 °F (36 °C)  Max: 98.5 °F (36.9 °C) 98.5 °F (36.9 °C)   BP  Min: 120/78  Max: 149/89 130/84   Pulse  Min: 60  Max: 88  74   Resp  Min: 16  Max: 21 (!) 21   SpO2  Min: 92 %  Max: 100 % (!) 93 %       Recent Labs   Lab 05/20/22  1134 05/22/22  0708   WBC 6.7 12.0*   RBC 4.28 4.36   HGB 11.5* 11.6*   HCT 38.0 39.7   MCV 88.8 91.1   MCH 26.9* 26.6*   MCHC 30.3* 29.2*   RDW 15.2 15.0    276   MPV 9.3* 10.0       Recent Labs   Lab 05/20/22  1134 05/22/22  0707    140   K 4.2 4.4   CO2 30* 24   BUN 11.6 22.9*   CREATININE 0.74 0.75   CALCIUM 10.2 9.7   ALBUMIN 3.4*  --    ALKPHOS 74  --    ALT 17  --    AST 15  --    BILITOT 0.3  --           Microbiology Results (last 7 days)     ** No results found for the last 168 hours. **           See below for Radiology    Scheduled Med:   albuterol-ipratropium  3 mL Nebulization Q4H    amLODIPine  10 mg Oral Daily    ARIPiprazole  5 mg Oral Daily    ascorbic acid (vitamin C)  500 mg Oral Daily    atorvastatin  10 mg Oral Daily    enoxaparin  40 mg Subcutaneous Daily    fluticasone propionate  1 spray Each Nostril Daily    gabapentin  300 mg Oral TID    lamoTRIgine  25 mg Oral Daily    memantine  5 mg Oral BID    methylPREDNISolone sodium succinate injection  40 mg Intravenous Q12H        Continuous Infusions:       PRN Meds:  acetaminophen, acetaminophen, benzonatate, bisacodyL, dextrose 10%, dextrose 10%, glucagon (human recombinant), glucose, glucose, ondansetron, sodium chloride 0.9%       Assessment/Plan:    Copd exacerbation w/o bronchitis  htn  Morbid obesity/ obesity hypoventilation syndrome  Hx of cva, bedbound   acute hypoxic hypercapnic respiratory  failure  Seasonal allergies  chuck on cpap  hld on replacement      Plan - transfer solumedrol to prednisone/ o2 discontinued/ abg's in am/ breo ellipta 200/25. Continue duonebs /cpap      Patient condition:  Stable/Fair/Guarded/ Serious/ Critical    Anticipated discharge and Disposition:      All diagnosis and differential diagnosis have been reviewed; assessment and plan has been documented; I have personally reviewed the labs and test results that are presently available; I have reviewed the patients medication list; I have reviewed the consulting providers response and recommendations. I have reviewed or attempted to review medical records based upon their availability    All of the patient's questions have been  addressed and answered. Patient's is agreeable to the above stated plan. I will continue to monitor closely and make adjustments to medical management as needed.  _____________________________________________________________________    Nutrition Status:    Radiology:  X-Ray Chest 1 View  Narrative: EXAMINATION:  XR CHEST 1 VIEW    CLINICAL HISTORY:  , Shortness of breath.    FINDINGS:  No alveolar consolidation, effusion, or pneumothorax is seen.   The thoracic aorta is ectatic and calcified, but otherwise the  cardiac silhouette, central pulmonary vessels and mediastinum are normal in size and are grossly unremarkable. Except for degenerative changes, the  visualized osseous structures are grossly unremarkable.  Impression: No acute chest disease is identified.    Electronically signed by: Neno Rubio  Date:    05/20/2022  Time:    11:32      Mansoor White MD   05/22/2022

## 2022-05-23 NOTE — PLAN OF CARE
Problem: Physical Therapy  Goal: Physical Therapy Goal  Description: Goals to be met by: 22     Patient will increase functional independence with mobility by performin. Supine to sit with MInimal Assistance  2. Sit to supine with MInimal Assistance  3. Sit to stand transfer with Minimal Assistance  4. Bed to chair transfer with Moderate Assistance using Wiliam-walker  5. Gait  x 15 feet with Moderate Assistance using Wiliam-walker.   6. Wheelchair propulsion x100 feet with Stand-by Assistance using RUE & RLE  7. Stand for 3 minutes with Minimal Assistance using Wiliam-walker    Outcome: Ongoing, Progressing

## 2022-05-23 NOTE — PROGRESS NOTES
[facility] Medicine Progress Note        Chief Complaint: Inpatient Follow-up for  Copd exacerbation/ acute hypoxic hypercapnic resp. failure    HPI: The patient is a 51-year-old white female with history of tobacco abuse for many years. She was smoking  at one time 2 packs per day and she quit 3 years ago after sustaining a cerebral vascular accident. Since then she has been bed-bound, she lives with family members and she gets sitters. She has history of unquantified COPD as well as multiple seasonal allergies. Patient is on no home oxygen. Patient refers feeling short of breath and decided to come to the emergency room for evaluation where she was found to be hypoxemic and hypercapnic. Patient has been admitted for COPD exacerbation with acute hypoxic/hypercapnic respiratory failure. Even though she refers she has COPD, her COPD has never been quantified. She has history of sleep apnea and uses CPAP. Eventually once she is better will discontinue oxygen and see if she gets  hypoxic to less than 88%. Also once she is better at baseline will do ABGs just see if she is hypercapnic/hypoxic and compensated to see if she qualifies for  trilogy to improve ventilation. The patient was admitted with azithromycin iv /nebulized treatment as well as Solu-Medrol IV. Will contact respiratory for of CPAP at night. At the present moment she is on 2 L nasal cannula with O2 sat around 94%. Patient is morbidly obese and may have a component of obesity hypoventilation syndrome as well.      Interval Hx:     5/22-Azithromycin has been dc, solumedrol iv weaned down. Pt is using cpap w/o issues and today looks great. Will transfer solumedrol to po. Get abg's in am.  Pt voices no complains. Plans for home health w pt upon discharge    5/23-ABGs were done this morning show in compensated a chronic hypoxic/hypercapnic respiratory failure, patient may be a candidate for trilogy.  Though patient has  no official diagnosis of COPD since she has not had PFTs in past          Objective/physical exam:  General: In no acute distress, afebrile. Morbidly obese female in no distress  Chest: Clear to auscultation bilaterally  Heart: RRR, +S1, S2, no appreciable murmur  Abdomen: Soft, nontender, BS +  MSK: Warm, no lower extremity edema, no clubbing or cyanosis  Neurologic: Alert and oriented x4, Cranial nerve II-XII intact, left hemiparesis/unable to reposition herself in bed    VITAL SIGNS: 24 HRS MIN & MAX LAST   Temp  Min: 97.8 °F (36.6 °C)  Max: 98.5 °F (36.9 °C) 97.8 °F (36.6 °C)   BP  Min: 116/78  Max: 131/84 123/82   Pulse  Min: 61  Max: 95  69   Resp  Min: 14  Max: 20 14   SpO2  Min: 89 %  Max: 99 % (!) 92 %       Recent Labs   Lab 05/20/22  1134 05/22/22  0708   WBC 6.7 12.0*   RBC 4.28 4.36   HGB 11.5* 11.6*   HCT 38.0 39.7   MCV 88.8 91.1   MCH 26.9* 26.6*   MCHC 30.3* 29.2*   RDW 15.2 15.0    276   MPV 9.3* 10.0       Recent Labs   Lab 05/20/22  1134 05/22/22  0707 05/23/22  0721    140  --    K 4.2 4.4  --    CO2 30* 24  --    BUN 11.6 22.9*  --    CREATININE 0.74 0.75  --    CALCIUM 10.2 9.7  --    PH  --   --  7.42   ALBUMIN 3.4*  --   --    ALKPHOS 74  --   --    ALT 17  --   --    AST 15  --   --    BILITOT 0.3  --   --           Microbiology Results (last 7 days)     ** No results found for the last 168 hours. **           See below for Radiology    Scheduled Med:   albuterol-ipratropium  3 mL Nebulization Q4H    amLODIPine  10 mg Oral Daily    ARIPiprazole  5 mg Oral Daily    ascorbic acid (vitamin C)  500 mg Oral Daily    atorvastatin  10 mg Oral Daily    enoxaparin  40 mg Subcutaneous Daily    fluticasone furoate-vilanteroL  1 puff Inhalation Daily    fluticasone propionate  1 spray Each Nostril Daily    gabapentin  300 mg Oral TID    lamoTRIgine  25 mg Oral Daily    memantine  5 mg Oral BID    miconazole NITRATE 2 %   Topical (Top) BID    predniSONE  20 mg Oral BID         Continuous Infusions:       PRN Meds:  acetaminophen, acetaminophen, benzonatate, bisacodyL, dextrose 10%, dextrose 10%, glucagon (human recombinant), glucose, glucose, ondansetron, sodium chloride 0.9%       Assessment/Plan:    Copd exacerbation w/o bronchitis  htn  Morbid obesity/ obesity hypoventilation syndrome  Hx of cva, bedbound   acute hypoxic hypercapnic respiratory failure  Seasonal allergies  chuck on cpap  hld on replacement  Acute on chronic hypoxic/hypercapnic respiratory failure      A plan-continue prednisone 20 mg p.o. b.i.d./CPAP at night.nebs  Will discuss patient with pulmonary is to see they can help us with trilogy      Patient condition:  Stable/Fair/Guarded/ Serious/ Critical    Anticipated discharge and Disposition:      All diagnosis and differential diagnosis have been reviewed; assessment and plan has been documented; I have personally reviewed the labs and test results that are presently available; I have reviewed the patients medication list; I have reviewed the consulting providers response and recommendations. I have reviewed or attempted to review medical records based upon their availability    All of the patient's questions have been  addressed and answered. Patient's is agreeable to the above stated plan. I will continue to monitor closely and make adjustments to medical management as needed.  __________________________________________________________________      Radiology:  CV Ultrasound doppler venous DVT leg left  There was no evidence of deep or superficial vein thrombosis in the left   lower extremity.      Mansoor White MD   05/23/2022

## 2022-05-23 NOTE — PLAN OF CARE
05/23/22 1535   Discharge Assessment   Assessment Type Discharge Planning Assessment   Confirmed/corrected address, phone number and insurance Yes   Source of Information patient;health record   Lives With other relative(s)   Do you expect to return to your current living situation? No  (requesting rehab)   Do you have help at home or someone to help you manage your care at home? Yes   Who are your caregiver(s) and their phone number(s)? family and sitters   Walking or Climbing Stairs Difficulty ambulation difficulty, requires equipment;stair climbing difficulty, dependent;transferring difficulty, requires equipment   Dressing/Bathing Difficulty bathing difficulty, assistance 1 person   Do you have any problems with: Errands/Grocery   Equipment Currently Used at Home bedside commode;wheelchair;walker, rolling;lift device   Patient currently being followed by outpatient case management? No   Do you currently have service(s) that help you manage your care at home? No   Do you take prescription medications? Yes   Do you have prescription coverage? Yes   Do you have any problems affording any of your prescribed medications? No   Is the patient taking medications as prescribed? yes   How do you get to doctors appointments? family or friend will provide   Are you on dialysis? No   Do you take coumadin? No   Discharge Plan A Rehab   Discharge Plan B Skilled Nursing Facility   DME Needed Upon Discharge  none   Discharge Plan discussed with: Patient

## 2022-05-23 NOTE — PT/OT/SLP EVAL
"Occupational Therapy   Evaluation    Name: Livia Kennedy  MRN: [id number]  Admitting Diagnosis:  COPD exacerbation      Recommendations:     Discharge Recommendations: rehabilitation facility  Discharge Equipment Recommendations:  bath bench  Barriers to discharge:   difficulty at home with ADL/mobility    Assessment:     Livia Kennedy is a 51 y.o. female with a medical diagnosis of COPD exacerbation.  She presents with chronic L weakness d/t CVA. She was able to walk ~10 ft with min A ~7 months ago; she has been primarily chair bound since that time, but her caregivers do assist her on/off bsc and in/out car. They report difficulty caring for her at home, with 1 recent fall during a car t/f. Pt is highly motivated and would like to become more mobile and independent--pt/caregiver are requesting rehab placement. Performance deficits affecting function: weakness, impaired endurance, impaired self care skills, impaired functional mobilty, decreased upper extremity function, impaired balance.      Rehab Prognosis: Good; patient would benefit from acute skilled OT services to address these deficits and reach maximum level of function.       Plan:     Patient to be seen 5 x/week, 6 x/week to address the above listed problems via self-care/home management, therapeutic activities, neuromuscular re-education  · Plan of Care Expires:    · Plan of Care Reviewed with: patient, caregiver    Subjective     Chief Complaint: cant walk  Patient/Family Comments/goals: wanting to go to rehab    Occupational Profile:  Living Environment: home with spouse and sitters 6 days per week, 7 jours per day  Previous level of function: able to perform upper body ADL on R side, needs assist for all mobility and L sided + LB ADL  Roles and Routines: uses a lift chair, bsc, & tate steady. Performs car t/fs for DM appts  Equipment Used at Home:  wheelchair, hospital bed, bedside commode, shower chair ("tate steady" transfer assist " device)  Assistance upon Discharge: has caregivers upon d/c    Pain/Comfort:  · Pain Rating 1: 0/10    Patients cultural, spiritual, Oriental orthodox conflicts given the current situation: yes    Objective:     Communicated with: RN prior to session.  Patient found HOB elevated with PureWick, telemetry upon OT entry to room.    General Precautions: Standard, fall   Orthopedic Precautions:N/A   Braces: N/A  Respiratory Status: Room air    Occupational Performance:    Bed Mobility:    · Patient completed Supine to Sit with moderate assistance  · Patient completed Sit to Supine with moderate assistance    Functional Mobility/Transfers:  · Patient completed Sit <> Stand Transfer with moderate assistance and of 2 persons  with  gait belt, HHA   · Functional Mobility: Pt able to perform weight shifting EOB with Mod A x 1, side steps along EOB with mod A x 2. No knee buckling at L LE, mild hyperext of knee noted.    Activities of Daily Living:  · Upper Body Dressing: maximal assistance .  · Lower Body Dressing: maximal assistance .  · Toileting: dependence and purwick .      Physical Exam:  Balance:    -       SBA EOB x 10 min, fatigues with prolonged sitting. Mod A in standing  Upper Extremity Strength:  RUE WNL; LUE contracted with AROM at scapula only (non-functional)      Treatment & Education:  Extensive review of chart, including previous therapy notes from IP and OP visits. Initial eval performed. Pt with previous refusal of OT services d/t wanting only to work on walking. After education on OT role/goals, pt is motivated and agreeable to work with OT. She and her caregiver endorse difficulty with ADLs and home, wanting training to improve independence with bath (tub/shower t/f), toileting, and standing tolerance & ADL t/fs. She would benefit from extensive IP therapy for strengthening and training on compensatory techniques to increase independence.  Education:    Patient left HOB elevated with all lines intact and call  button in reach    GOALS:   Multidisciplinary Problems     Occupational Therapy Goals        Problem: Occupational Therapy    Goal Priority Disciplines Outcome Interventions   Occupational Therapy Goal     OT, PT/OT Ongoing, Progressing    Description: LTG: Caregiver will be able to safely assist pt with all ADL/transfers with good safety and no falls by d/c.    STG: to be met in 2 weeks, by 6/6  1. Pt will perform UB dressing with Min A.  2. Pt will perform LB with Mod A.  3. Pt will perform grooming in standing at sink >3 min with CGA using ellis-walker.  4. Pt will perform bsc t/f with min A using ellis-walker.  5. Pt will perform toileting with min A using bsc and ellis-walker.  6. Pt will perform tub t/f using HW and TTB.                   History:     Past Medical History:   Diagnosis Date    COPD exacerbation 05/21/2022    Hypertension     Sleep apnea     Stroke        History reviewed. No pertinent surgical history.    Time Tracking:     OT Date of Treatment: 05/23/22  OT Start Time: 0908  OT Stop Time: 0927  OT Total Time (min): 19 min    Billable Minutes:Evaluation high    5/23/2022

## 2022-05-24 LAB
POCT GLUCOSE: 111 MG/DL (ref 70–110)
POCT GLUCOSE: 136 MG/DL (ref 70–110)

## 2022-05-24 PROCEDURE — 25000003 PHARM REV CODE 250: Performed by: INTERNAL MEDICINE

## 2022-05-24 PROCEDURE — 63600175 PHARM REV CODE 636 W HCPCS: Performed by: INTERNAL MEDICINE

## 2022-05-24 PROCEDURE — 25000242 PHARM REV CODE 250 ALT 637 W/ HCPCS: Performed by: INTERNAL MEDICINE

## 2022-05-24 PROCEDURE — 97530 THERAPEUTIC ACTIVITIES: CPT

## 2022-05-24 PROCEDURE — 94761 N-INVAS EAR/PLS OXIMETRY MLT: CPT

## 2022-05-24 PROCEDURE — G0378 HOSPITAL OBSERVATION PER HR: HCPCS

## 2022-05-24 PROCEDURE — 94640 AIRWAY INHALATION TREATMENT: CPT | Mod: XB

## 2022-05-24 PROCEDURE — 99900035 HC TECH TIME PER 15 MIN (STAT)

## 2022-05-24 PROCEDURE — 97112 NEUROMUSCULAR REEDUCATION: CPT

## 2022-05-24 PROCEDURE — 94660 CPAP INITIATION&MGMT: CPT

## 2022-05-24 PROCEDURE — 96372 THER/PROPH/DIAG INJ SC/IM: CPT | Performed by: INTERNAL MEDICINE

## 2022-05-24 RX ORDER — ALPRAZOLAM 0.25 MG/1
0.25 TABLET ORAL 3 TIMES DAILY PRN
Status: DISCONTINUED | OUTPATIENT
Start: 2022-05-24 | End: 2022-05-27 | Stop reason: HOSPADM

## 2022-05-24 RX ADMIN — PREDNISONE 20 MG: 20 TABLET ORAL at 08:05

## 2022-05-24 RX ADMIN — IPRATROPIUM BROMIDE AND ALBUTEROL SULFATE 3 ML: 2.5; .5 SOLUTION RESPIRATORY (INHALATION) at 12:05

## 2022-05-24 RX ADMIN — FLUTICASONE PROPIONATE 50 MCG: 50 SPRAY, METERED NASAL at 08:05

## 2022-05-24 RX ADMIN — IPRATROPIUM BROMIDE AND ALBUTEROL SULFATE 3 ML: 2.5; .5 SOLUTION RESPIRATORY (INHALATION) at 04:05

## 2022-05-24 RX ADMIN — MEMANTINE 5 MG: 5 TABLET ORAL at 08:05

## 2022-05-24 RX ADMIN — MICONAZOLE NITRATE 2 % TOPICAL POWDER: at 08:05

## 2022-05-24 RX ADMIN — GABAPENTIN 300 MG: 300 CAPSULE ORAL at 03:05

## 2022-05-24 RX ADMIN — FLUTICASONE FUROATE AND VILANTEROL TRIFENATATE 1 PUFF: 200; 25 POWDER RESPIRATORY (INHALATION) at 08:05

## 2022-05-24 RX ADMIN — GABAPENTIN 300 MG: 300 CAPSULE ORAL at 08:05

## 2022-05-24 RX ADMIN — MICONAZOLE NITRATE 2 % TOPICAL POWDER: at 09:05

## 2022-05-24 RX ADMIN — ENOXAPARIN SODIUM 40 MG: 40 INJECTION SUBCUTANEOUS at 05:05

## 2022-05-24 RX ADMIN — LAMOTRIGINE 25 MG: 25 TABLET ORAL at 08:05

## 2022-05-24 RX ADMIN — IPRATROPIUM BROMIDE AND ALBUTEROL SULFATE 3 ML: 2.5; .5 SOLUTION RESPIRATORY (INHALATION) at 07:05

## 2022-05-24 RX ADMIN — OXYCODONE HYDROCHLORIDE AND ACETAMINOPHEN 500 MG: 500 TABLET ORAL at 08:05

## 2022-05-24 RX ADMIN — ARIPIPRAZOLE 5 MG: 5 TABLET ORAL at 08:05

## 2022-05-24 RX ADMIN — ATORVASTATIN CALCIUM 10 MG: 10 TABLET, FILM COATED ORAL at 08:05

## 2022-05-24 RX ADMIN — ALPRAZOLAM 0.25 MG: 0.25 TABLET ORAL at 08:05

## 2022-05-24 RX ADMIN — AMLODIPINE BESYLATE 10 MG: 5 TABLET ORAL at 08:05

## 2022-05-24 RX ADMIN — IPRATROPIUM BROMIDE AND ALBUTEROL SULFATE 3 ML: 2.5; .5 SOLUTION RESPIRATORY (INHALATION) at 08:05

## 2022-05-24 NOTE — PROGRESS NOTES
[facility] Medicine Progress Note        Chief Complaint: Inpatient Follow-up for  Copd exacerbation/ acute hypoxic hypercapnic resp. failure    HPI: The patient is a 51-year-old white female with history of tobacco abuse for many years. She was smoking  at one time 2 packs per day and she quit 3 years ago after sustaining a cerebral vascular accident. Since then she has been bed-bound, she lives with family members and she gets sitters. She has history of unquantified COPD as well as multiple seasonal allergies. Patient is on no home oxygen. Patient refers feeling short of breath and decided to come to the emergency room for evaluation where she was found to be hypoxemic and hypercapnic. Patient has been admitted for COPD exacerbation with acute hypoxic/hypercapnic respiratory failure. Even though she refers she has COPD, her COPD has never been quantified. She has history of sleep apnea and uses CPAP. Eventually once she is better will discontinue oxygen and see if she gets  hypoxic to less than 88%. Also once she is better at baseline will do ABGs just see if she is hypercapnic/hypoxic and compensated to see if she qualifies for  trilogy to improve ventilation. The patient was admitted with azithromycin iv /nebulized treatment as well as Solu-Medrol IV. Will contact respiratory for of CPAP at night. At the present moment she is on 2 L nasal cannula with O2 sat around 94%. Patient is morbidly obese and may have a component of obesity hypoventilation syndrome as well.      Interval Hx:     5/22-Azithromycin has been dc, solumedrol iv weaned down. Pt is using cpap w/o issues and today looks great. Will transfer solumedrol to po. Get abg's in am.  Pt voices no complains. Plans for home health w pt upon discharge    5/23-ABGs were done this morning show in compensated a chronic hypoxic/hypercapnic respiratory failure, patient may be a candidate for trilogy.  Though patient has  no official diagnosis of COPD since she has not had PFTs in past    5-24 refers feeling well. Will do bedside spirometry. Awaiting placement; Highly motivated to improve mobilization. Tolerating PT, walking a few steps        Objective/physical exam:  General: In no acute distress, afebrile. Morbidly obese female in no distress  Chest: Clear to auscultation bilaterally  Heart: RRR, +S1, S2, no appreciable murmur  Abdomen: Soft, nontender, BS +  MSK: Warm, no lower extremity edema, no clubbing or cyanosis  Neurologic: Alert and oriented x4, Cranial nerve II-XII intact, left hemiparesis    VITAL SIGNS: 24 HRS MIN & MAX LAST   Temp  Min: 97.5 °F (36.4 °C)  Max: 98.5 °F (36.9 °C) 97.9 °F (36.6 °C)   BP  Min: 121/84  Max: 135/88 121/84   Pulse  Min: 56  Max: 76  75   Resp  Min: 13  Max: 20 18   SpO2  Min: 90 %  Max: 97 % (!) 90 %       Recent Labs   Lab 05/20/22  1134 05/22/22  0708   WBC 6.7 12.0*   RBC 4.28 4.36   HGB 11.5* 11.6*   HCT 38.0 39.7   MCV 88.8 91.1   MCH 26.9* 26.6*   MCHC 30.3* 29.2*   RDW 15.2 15.0    276   MPV 9.3* 10.0       Recent Labs   Lab 05/20/22  1134 05/22/22  0707 05/23/22  0721    140  --    K 4.2 4.4  --    CO2 30* 24  --    BUN 11.6 22.9*  --    CREATININE 0.74 0.75  --    CALCIUM 10.2 9.7  --    PH  --   --  7.42   ALBUMIN 3.4*  --   --    ALKPHOS 74  --   --    ALT 17  --   --    AST 15  --   --    BILITOT 0.3  --   --           Microbiology Results (last 7 days)     ** No results found for the last 168 hours. **           See below for Radiology    Scheduled Med:   albuterol-ipratropium  3 mL Nebulization Q4H    amLODIPine  10 mg Oral Daily    ARIPiprazole  5 mg Oral Daily    ascorbic acid (vitamin C)  500 mg Oral Daily    atorvastatin  10 mg Oral Daily    enoxaparin  40 mg Subcutaneous Daily    fluticasone furoate-vilanteroL  1 puff Inhalation Daily    fluticasone propionate  1 spray Each Nostril Daily    gabapentin  300 mg Oral TID    lamoTRIgine  25 mg Oral Daily     memantine  5 mg Oral BID    miconazole NITRATE 2 %   Topical (Top) BID    predniSONE  20 mg Oral BID        Continuous Infusions:       PRN Meds:  acetaminophen, acetaminophen, benzonatate, bisacodyL, dextrose 10%, dextrose 10%, glucagon (human recombinant), glucose, glucose, ondansetron, sodium chloride 0.9%       Assessment/Plan:    Unquantified Copd exacerbation w/o bronchitis  htn  Morbid obesity/ obesity hypoventilation syndrome  Hx of cva,   acute on chronic hypoxic hypercapnic respiratory failure  Seasonal allergies  Home O2  chuck on cpap  hld on replacement        A plan-continue prednisone 20 mg p.o. b.i.d./CPAP at night.nebs/bedside spirometry      Patient condition:  Guarded/    Anticipated discharge and Disposition:      All diagnosis and differential diagnosis have been reviewed; assessment and plan has been documented; I have personally reviewed the labs and test results that are presently available; I have reviewed the patients medication list; I have reviewed the consulting providers response and recommendations. I have reviewed or attempted to review medical records based upon their availability    All of the patient's questions have been  addressed and answered. Patient's is agreeable to the above stated plan. I will continue to monitor closely and make adjustments to medical management as needed.  __________________________________________________________________      Radiology:  CV Ultrasound doppler venous DVT leg left  There was no evidence of deep or superficial vein thrombosis in the left   lower extremity.      Mansoor White MD   05/24/2022

## 2022-05-24 NOTE — PT/OT/SLP PROGRESS
Physical Therapy         Treatment        Livia Kennedy   MRN: [id number]     PT Received On: 05/24/22  PT Start Time: 1447     PT Stop Time: 1530    PT Total Time (min): 43 min       Billable Minutes:  Therapeutic Activity 10 and Neuromuscular Re-education 33  Total Minutes: 43    Treatment Type: Treatment  PT/PTA: PT     PTA Visit Number: 1       General Precautions: Standard, fall  Orthopedic Precautions: Orthopedic Precautions : N/A   Braces: Braces: N/A    Spiritual, Cultural Beliefs, Restorationism Practices, Values that Affect Care: no    Subjective:  Communicated with NSG prior to session.    Pain/Comfort  Pain Rating 1: 0/10    Objective:  Patient found in bed with HOB elevated, with Patient found with: PureWick, telemetry    Functional Mobility:  Bed Mobility:   Supine to sit: Maximum Assistance   Sit to supine: Maximum Assistance    Transfer Training:   Sit to stand:Moderate Assistance of 1 person + Minimum Assistance of 1 persons with Wiliam-walker; performed x1 from EOB; performed x5 from wheelchair     Bed <> Wheel Chair:  Stand Pivot with Moderate Assistance of 2 persons with No Assistive Device    Additional Treatment:  · Semi-supine in bed: hip/knee flex x 8 reps   · Sitting EOB: knee flex x5 reps w/ towel under foot; pt able to initiate motion, then PROM assist provided to complete range  · Weight shifting (mod-maxA w/ R UE assist on HW)  · Lateral weight shifts:   · 2 trials of x8 reps on each LE  · Forward weight shifts:   · L LE placed slightly in front of R LE  · 2 trials of x3 reps   · Step Training: forward steps of L LE  · Trial 1: x3 reps performed; able to lift leg to mimic stepping and then bring LE back to a neutral position (noted decreased knee flex/mostly remained extended)  · Trial 2: x1 reps performed/attemped; decreased ability to self perform with fatigue; unable to perform more 2/2 requesting to sit  · Attempted to give pt a target on floor to aim for; however, pt unable to look down to  see 2/2 dizziness  *seated rest breaks btw all trials/ activities 2/2 fatigue    Activity Tolerance:  Patient tolerated treatment well    Patient left HOB elevated with all lines intact and call button in reach.    Assessment:  Livia Kennedy is a 51 y.o. female with a medical diagnosis of COPD exacerbation.     Patient tolerated PT tx session fairly well. Required constant cues throughout session 2/2 pt tending to hold breath. Frequent rest breaks required throughout. After trial 2 of step training pt requested to sit and began to cry reporting that she is very anxious and fearful of falling; comfort provided. Pt then requesting to return B2B.    Rehab potential is good.    Activity tolerance: Good    Discharge recommendations: Discharge Facility/Level of Care Needs: rehabilitation facility     Equipment recommendations: Equipment Needed After Discharge: none     GOALS:   Multidisciplinary Problems     Physical Therapy Goals        Problem: Physical Therapy    Goal Priority Disciplines Outcome Goal Variances Interventions   Physical Therapy Goal     PT, PT/OT Ongoing, Progressing     Description: Goals to be met by: 22     Patient will increase functional independence with mobility by performin. Supine to sit with MInimal Assistance  2. Sit to supine with MInimal Assistance  3. Sit to stand transfer with Minimal Assistance  4. Bed to chair transfer with Moderate Assistance using Wiliam-walker  5. Gait  x 15 feet with Moderate Assistance using Wiliam-walker.   6. Wheelchair propulsion x100 feet with Stand-by Assistance using RUE & RLE  7. Stand for 3 minutes with Minimal Assistance using Wiliam-walker                     PLAN:    Patient to be seen daily  to address the above listed problems via gait training, therapeutic activities, therapeutic exercises, neuromuscular re-education  Plan of Care expires: 22  Plan of Care reviewed with: patient         2022

## 2022-05-24 NOTE — PROGRESS NOTES
"Nutrition   Progress Note      Recommendations:  - Continue current diet as tolerated.   - Monitor wt, labs, and intake.       Reason for Evaluation:  Identified at risk by screening criteria    Diagnosis:    1. COPD exacerbation    2. Shortness of breath    3. Leg swelling    4. SOB (shortness of breath)    5. Pain        Relevant Medical History:    Past Medical History:   Diagnosis Date    COPD exacerbation 05/21/2022    Hypertension     Sleep apnea     Stroke          Nutrition Diet History:    Factors affecting nutritional intake: none identified at this time    Food / Advent / Culture Preferences: none      Nutrition Prescription Ordered:    Current Diet Order:  Regular    Appetite:  good    PO intake: 75 - 100 %      Labs / Medications / Procedures:    Nutrition Related Medications: Vit C, Prednisone    Nutrition Related Labs:  5/22/22: Glu 135, GFR>60      Anthropometrics:  Height: 5' 7" (1.702 m)  Admit Weight:  Weight: 90.7 kg (200 lb)  Latest Weight:  129.8 kg (286 lb 2.5 oz)    Wt Readings from Last 5 Encounters:   05/20/22 129.8 kg (286 lb 2.5 oz)   05/20/21 109.3 kg (240 lb 15.4 oz)     IBW: 135 lbs (61.36kg)  %IBW: 211.89  UBW: 129.8kg  %Weight Change: 0  Body mass index is 44.82 kg/m².  BMI classification:  Obese Grade III (BMI >/= 40)      Nutrition Narrative:  Pt reports good intake/appetite; reports usual good appetite prior to admit; denies n/v.     Monitoring and Evaluation:    Nutrition Monitoring and Evaluation:  food and beverage intake, diet order and weight change    Nutrition Risk:  Level of Nutrition Risk:  Low  Frequency of Follow up:  Dietitian will f/up within 7 days.          "

## 2022-05-24 NOTE — PROGRESS NOTES
[facility] Medicine Progress Note        Chief Complaint: Inpatient Follow-up for  Copd exacerbation/ acute hypoxic hypercapnic resp. failure    HPI: The patient is a 51-year-old white female with history of tobacco abuse for many years. She was smoking  at one time 2 packs per day and she quit 3 years ago after sustaining a cerebral vascular accident. Since then she has been bed-bound, she lives with family members and she gets sitters. She has history of unquantified COPD as well as multiple seasonal allergies. Patient is on no home oxygen. Patient refers feeling short of breath and decided to come to the emergency room for evaluation where she was found to be hypoxemic and hypercapnic. Patient has been admitted for COPD exacerbation with acute hypoxic/hypercapnic respiratory failure. Even though she refers she has COPD, her COPD has never been quantified. She has history of sleep apnea and uses CPAP. Eventually once she is better will discontinue oxygen and see if she gets  hypoxic to less than 88%. Also once she is better at baseline will do ABGs just see if she is hypercapnic/hypoxic and compensated to see if she qualifies for  trilogy to improve ventilation. The patient was admitted with azithromycin iv /nebulized treatment as well as Solu-Medrol IV. Will contact respiratory for of CPAP at night. At the present moment she is on 2 L nasal cannula with O2 sat around 94%. Patient is morbidly obese and may have a component of obesity hypoventilation syndrome as well.      Interval Hx:     5/22-Azithromycin has been dc, solumedrol iv weaned down. Pt is using cpap w/o issues and today looks great. Will transfer solumedrol to po. Get abg's in am.  Pt voices no complains. Plans for home health w pt upon discharge    5/23-ABGs were done this morning show in compensated a chronic hypoxic/hypercapnic respiratory failure, patient may be a candidate for trilogy.  Though patient has  no official diagnosis of COPD since she has not had PFTs in past          Objective/physical exam:  General: In no acute distress, afebrile. Morbidly obese female in no distress  Chest: Clear to auscultation bilaterally  Heart: RRR, +S1, S2, no appreciable murmur  Abdomen: Soft, nontender, BS +  MSK: Warm, no lower extremity edema, no clubbing or cyanosis  Neurologic: Alert and oriented x4, Cranial nerve II-XII intact, left hemiparesis/unable to reposition herself in bed    VITAL SIGNS: 24 HRS MIN & MAX LAST   Temp  Min: 97.5 °F (36.4 °C)  Max: 98.5 °F (36.9 °C) 98.3 °F (36.8 °C)   BP  Min: 123/79  Max: 135/88 (!) 130/91   Pulse  Min: 56  Max: 76  72   Resp  Min: 13  Max: 20 20   SpO2  Min: 90 %  Max: 97 % 97 %       Recent Labs   Lab 05/20/22  1134 05/22/22  0708   WBC 6.7 12.0*   RBC 4.28 4.36   HGB 11.5* 11.6*   HCT 38.0 39.7   MCV 88.8 91.1   MCH 26.9* 26.6*   MCHC 30.3* 29.2*   RDW 15.2 15.0    276   MPV 9.3* 10.0       Recent Labs   Lab 05/20/22  1134 05/22/22  0707 05/23/22  0721    140  --    K 4.2 4.4  --    CO2 30* 24  --    BUN 11.6 22.9*  --    CREATININE 0.74 0.75  --    CALCIUM 10.2 9.7  --    PH  --   --  7.42   ALBUMIN 3.4*  --   --    ALKPHOS 74  --   --    ALT 17  --   --    AST 15  --   --    BILITOT 0.3  --   --           Microbiology Results (last 7 days)     ** No results found for the last 168 hours. **           See below for Radiology    Scheduled Med:   albuterol-ipratropium  3 mL Nebulization Q4H    amLODIPine  10 mg Oral Daily    ARIPiprazole  5 mg Oral Daily    ascorbic acid (vitamin C)  500 mg Oral Daily    atorvastatin  10 mg Oral Daily    enoxaparin  40 mg Subcutaneous Daily    fluticasone furoate-vilanteroL  1 puff Inhalation Daily    fluticasone propionate  1 spray Each Nostril Daily    gabapentin  300 mg Oral TID    lamoTRIgine  25 mg Oral Daily    memantine  5 mg Oral BID    miconazole NITRATE 2 %   Topical (Top) BID    predniSONE  20 mg Oral BID         Continuous Infusions:       PRN Meds:  acetaminophen, acetaminophen, benzonatate, bisacodyL, dextrose 10%, dextrose 10%, glucagon (human recombinant), glucose, glucose, ondansetron, sodium chloride 0.9%       Assessment/Plan:    Copd exacerbation w/o bronchitis  htn  Morbid obesity/ obesity hypoventilation syndrome  Hx of cva, bedbound   acute hypoxic hypercapnic respiratory failure  Seasonal allergies  chuck on cpap  hld on replacement  Acute on chronic hypoxic/hypercapnic respiratory failure      A plan-continue prednisone 20 mg p.o. b.i.d./CPAP at night.nebs  Will discuss patient with pulmonary is to see they can help us with trilogy          Patient condition:  Stable/Fair/Guarded/ Serious/ Critical    Anticipated discharge and Disposition:      All diagnosis and differential diagnosis have been reviewed; assessment and plan has been documented; I have personally reviewed the labs and test results that are presently available; I have reviewed the patients medication list; I have reviewed the consulting providers response and recommendations. I have reviewed or attempted to review medical records based upon their availability    All of the patient's questions have been  addressed and answered. Patient's is agreeable to the above stated plan. I will continue to monitor closely and make adjustments to medical management as needed.  __________________________________________________________________      Radiology:  CV Ultrasound doppler venous DVT leg left  There was no evidence of deep or superficial vein thrombosis in the left   lower extremity.      Mansoor White MD   05/24/2022

## 2022-05-24 NOTE — PT/OT/SLP PROGRESS
Occupational Therapy   Treatment    Name: Livia Kennedy  MRN: [id number]  Admitting Diagnosis:  COPD exacerbation       Recommendations:     Discharge Recommendations: rehabilitation facility  Discharge Equipment Recommendations:  bath bench      Assessment:     Livia Kennedy is a 51 y.o. female with a medical diagnosis of COPD exacerbation.  She presents with excellent motivation in therapy. Performance deficits affecting function are impaired functional mobilty, impaired self care skills, impaired endurance, weakness, impaired balance.     Rehab Prognosis:  Good; patient would benefit from acute skilled OT services to address these deficits and reach maximum level of function.       Plan:     Patient to be seen 5 x/week, 6 x/week to address the above listed problems via self-care/home management, therapeutic activities, neuromuscular re-education  · Plan of Care Expires:    · Plan of Care Reviewed with: patient, caregiver    Subjective     Pain/Comfort:  · Pain Rating 1: 0/10    Objective:      Patient found HOB elevated with PureWick, telemetry upon OT entry to room.    General Precautions: Standard, fall   Orthopedic Precautions:N/A   Braces: N/A  Respiratory Status: Room air     Occupational Performance:     Bed Mobility:    · Patient completed Supine to Sit with moderate assistance  · Patient completed Sit to Supine with maximal assistance     Functional Mobility/Transfers:  · Patient completed Sit <> Stand Transfer with min A x 2 trials from EOB; max A x 2 trials from bedside chair. VC for forward weight shift  · Patient completed Bed <> Chair Transfer using stand pivot technique and gait belt. mod A x 2 bed>chair, max A x 2 chair>bed.  · Functional Mobility (there ax performed in prep for standing ADL and ADL t/fs): static standing balance x 1 min x 2 trials performed with min A x 2 EOB, VC for upright posture. Weight shifting EOB x 30 sec, x 2 trials with mod A x1 with RUE support. Side stepping along EOB  with max A x 1 with RUE support.     Activities of Daily Living:  · Grooming: minimum assistance seated EOB  - SPV sitting balance x 10 min with no LOB during grooming ax     Treatment & Education:  Therapeutic activities provided with emphasis on improved sit balance, standing balance, transitional movements, and functional t/fs in order to improve independence with OOB ADL. Caregiver present throughout; pt/caregiver educated on safe body mechanics and techniques for all mobility tasks.    Patient left HOB elevated with all lines intact and call button in reachEducation:      GOALS:   Multidisciplinary Problems     Occupational Therapy Goals        Problem: Occupational Therapy    Goal Priority Disciplines Outcome Interventions   Occupational Therapy Goal     OT, PT/OT Ongoing, Progressing    Description: LTG: Caregiver will be able to safely assist pt with all ADL/transfers with good safety and no falls by d/c.    STG: to be met in 2 weeks, by 6/6  1. Pt will perform UB dressing with Min A.  2. Pt will perform LB with Mod A.  3. Pt will perform grooming in standing at sink >3 min with CGA using ellis-walker.  4. Pt will perform bsc t/f with min A using ellis-walker.  5. Pt will perform toileting with min A using bsc and ellis-walker.  6. Pt will perform tub t/f using HW and TTB.                   Time Tracking:     OT Date of Treatment: 05/24/22  OT Start Time: 1014  OT Stop Time: 1056  OT Total Time (min): 42 min    Billable Minutes:Therapeutic Activity - 3 units    OT/DAVONTE: OT     DAVONTE Visit Number: 1    5/24/2022

## 2022-05-25 LAB
GLUCOSE SERPL-MCNC: 120 MG/DL (ref 70–110)
POCT GLUCOSE: 120 MG/DL (ref 70–110)
POCT GLUCOSE: 172 MG/DL (ref 70–110)
POCT GLUCOSE: 190 MG/DL (ref 70–110)

## 2022-05-25 PROCEDURE — 94660 CPAP INITIATION&MGMT: CPT

## 2022-05-25 PROCEDURE — G0378 HOSPITAL OBSERVATION PER HR: HCPCS

## 2022-05-25 PROCEDURE — 99900035 HC TECH TIME PER 15 MIN (STAT)

## 2022-05-25 PROCEDURE — 94375 RESPIRATORY FLOW VOLUME LOOP: CPT

## 2022-05-25 PROCEDURE — 63600175 PHARM REV CODE 636 W HCPCS: Performed by: INTERNAL MEDICINE

## 2022-05-25 PROCEDURE — 97530 THERAPEUTIC ACTIVITIES: CPT

## 2022-05-25 PROCEDURE — 94761 N-INVAS EAR/PLS OXIMETRY MLT: CPT

## 2022-05-25 PROCEDURE — 97116 GAIT TRAINING THERAPY: CPT | Mod: CQ

## 2022-05-25 PROCEDURE — 96372 THER/PROPH/DIAG INJ SC/IM: CPT | Performed by: INTERNAL MEDICINE

## 2022-05-25 PROCEDURE — 97535 SELF CARE MNGMENT TRAINING: CPT

## 2022-05-25 PROCEDURE — 25000003 PHARM REV CODE 250: Performed by: INTERNAL MEDICINE

## 2022-05-25 PROCEDURE — 27000221 HC OXYGEN, UP TO 24 HOURS

## 2022-05-25 PROCEDURE — 94640 AIRWAY INHALATION TREATMENT: CPT | Mod: XB

## 2022-05-25 PROCEDURE — 97530 THERAPEUTIC ACTIVITIES: CPT | Mod: CQ

## 2022-05-25 PROCEDURE — 25000242 PHARM REV CODE 250 ALT 637 W/ HCPCS: Performed by: INTERNAL MEDICINE

## 2022-05-25 PROCEDURE — 99900031 HC PATIENT EDUCATION (STAT)

## 2022-05-25 RX ADMIN — FLUTICASONE FUROATE AND VILANTEROL TRIFENATATE 1 PUFF: 200; 25 POWDER RESPIRATORY (INHALATION) at 08:05

## 2022-05-25 RX ADMIN — IPRATROPIUM BROMIDE AND ALBUTEROL SULFATE 3 ML: 2.5; .5 SOLUTION RESPIRATORY (INHALATION) at 01:05

## 2022-05-25 RX ADMIN — ATORVASTATIN CALCIUM 10 MG: 10 TABLET, FILM COATED ORAL at 08:05

## 2022-05-25 RX ADMIN — OXYCODONE HYDROCHLORIDE AND ACETAMINOPHEN 500 MG: 500 TABLET ORAL at 08:05

## 2022-05-25 RX ADMIN — GABAPENTIN 300 MG: 300 CAPSULE ORAL at 08:05

## 2022-05-25 RX ADMIN — MEMANTINE 5 MG: 5 TABLET ORAL at 09:05

## 2022-05-25 RX ADMIN — PREDNISONE 20 MG: 20 TABLET ORAL at 08:05

## 2022-05-25 RX ADMIN — IPRATROPIUM BROMIDE AND ALBUTEROL SULFATE 3 ML: 2.5; .5 SOLUTION RESPIRATORY (INHALATION) at 08:05

## 2022-05-25 RX ADMIN — GABAPENTIN 300 MG: 300 CAPSULE ORAL at 09:05

## 2022-05-25 RX ADMIN — ENOXAPARIN SODIUM 40 MG: 40 INJECTION SUBCUTANEOUS at 04:05

## 2022-05-25 RX ADMIN — PREDNISONE 20 MG: 20 TABLET ORAL at 09:05

## 2022-05-25 RX ADMIN — ALPRAZOLAM 0.25 MG: 0.25 TABLET ORAL at 09:05

## 2022-05-25 RX ADMIN — FLUTICASONE PROPIONATE 50 MCG: 50 SPRAY, METERED NASAL at 08:05

## 2022-05-25 RX ADMIN — MICONAZOLE NITRATE 2 % TOPICAL POWDER: at 09:05

## 2022-05-25 RX ADMIN — GABAPENTIN 300 MG: 300 CAPSULE ORAL at 03:05

## 2022-05-25 RX ADMIN — LAMOTRIGINE 25 MG: 25 TABLET ORAL at 08:05

## 2022-05-25 RX ADMIN — IPRATROPIUM BROMIDE AND ALBUTEROL SULFATE 3 ML: 2.5; .5 SOLUTION RESPIRATORY (INHALATION) at 09:05

## 2022-05-25 RX ADMIN — AMLODIPINE BESYLATE 10 MG: 5 TABLET ORAL at 08:05

## 2022-05-25 RX ADMIN — ARIPIPRAZOLE 5 MG: 5 TABLET ORAL at 08:05

## 2022-05-25 RX ADMIN — IPRATROPIUM BROMIDE AND ALBUTEROL SULFATE 3 ML: 2.5; .5 SOLUTION RESPIRATORY (INHALATION) at 04:05

## 2022-05-25 RX ADMIN — MICONAZOLE NITRATE 2 % TOPICAL POWDER: at 08:05

## 2022-05-25 RX ADMIN — IPRATROPIUM BROMIDE AND ALBUTEROL SULFATE 3 ML: 2.5; .5 SOLUTION RESPIRATORY (INHALATION) at 12:05

## 2022-05-25 RX ADMIN — MEMANTINE 5 MG: 5 TABLET ORAL at 08:05

## 2022-05-25 NOTE — PLAN OF CARE
05/25/22 1013   Discharge Reassessment   Assessment Type Discharge Planning Reassessment   Discharge Plan A Rehab  ([facility])   Post-Acute Status   Post-Acute Authorization Placement   Post-Acute Placement Status Pending payor review/awaiting authorization (if required)

## 2022-05-25 NOTE — PROGRESS NOTES
[facility] Medicine Progress Note        Chief Complaint: Inpatient Follow-up for  Copd exacerbation/ acute hypoxic hypercapnic resp. failure    HPI: The patient is a 51-year-old white female with history of tobacco abuse for many years. She was smoking  at one time 2 packs per day and she quit 3 years ago after sustaining a cerebral vascular accident. Since then she has been bed-bound, she lives with family members and she gets sitters. She has history of unquantified COPD as well as multiple seasonal allergies. Patient is on no home oxygen. Patient refers feeling short of breath and decided to come to the emergency room for evaluation where she was found to be hypoxemic and hypercapnic. Patient has been admitted for COPD exacerbation with acute hypoxic/hypercapnic respiratory failure. Even though she refers she has COPD, her COPD has never been quantified. She has history of sleep apnea and uses CPAP. Eventually once she is better will discontinue oxygen and see if she gets  hypoxic to less than 88%. Also once she is better at baseline will do ABGs just see if she is hypercapnic/hypoxic and compensated to see if she qualifies for  trilogy to improve ventilation. The patient was admitted with azithromycin iv /nebulized treatment as well as Solu-Medrol IV. Will contact respiratory for of CPAP at night. At the present moment she is on 2 L nasal cannula with O2 sat around 94%. Patient is morbidly obese and may have a component of obesity hypoventilation syndrome as well.      Interval Hx:     5/22-Azithromycin has been dc, solumedrol iv weaned down. Pt is using cpap w/o issues and today looks great. Will transfer solumedrol to po. Get abg's in am.  Pt voices no complains. Plans for home health w pt upon discharge    5/23-ABGs were done this morning show in compensated a chronic hypoxic/hypercapnic respiratory failure, patient may be a candidate for trilogy.  Though patient has  no official diagnosis of COPD since she has not had PFTs in past    5-24 refers feeling well. Will do bedside spirometry. Awaiting placement; Highly motivated to improve mobilization. Tolerating PT, walking a few steps    5/25- awaiting placement/meantime we are attemtping to get baljeet trilogy. Continues on bipap at night        Objective/physical exam:  General: In no acute distress, afebrile. Morbidly obese female in no distress  Chest: Clear to auscultation bilaterally  Heart: RRR, +S1, S2, no appreciable murmur  Abdomen: Soft, nontender, BS +  MSK: Warm, no lower extremity edema, no clubbing or cyanosis  Neurologic: Alert and oriented x4, Cranial nerve II-XII intact, left hemiparesis    VITAL SIGNS: 24 HRS MIN & MAX LAST   Temp  Min: 96.5 °F (35.8 °C)  Max: 97.9 °F (36.6 °C) 97.8 °F (36.6 °C)   BP  Min: 121/84  Max: 132/89 130/87   Pulse  Min: 59  Max: 88  70   Resp  Min: 13  Max: 20 16   SpO2  Min: 90 %  Max: 98 % (!) 94 %       Recent Labs   Lab 05/20/22  1134 05/22/22  0708   WBC 6.7 12.0*   RBC 4.28 4.36   HGB 11.5* 11.6*   HCT 38.0 39.7   MCV 88.8 91.1   MCH 26.9* 26.6*   MCHC 30.3* 29.2*   RDW 15.2 15.0    276   MPV 9.3* 10.0       Recent Labs   Lab 05/20/22  1134 05/22/22  0707 05/23/22  0721    140  --    K 4.2 4.4  --    CO2 30* 24  --    BUN 11.6 22.9*  --    CREATININE 0.74 0.75  --    CALCIUM 10.2 9.7  --    PH  --   --  7.42   ALBUMIN 3.4*  --   --    ALKPHOS 74  --   --    ALT 17  --   --    AST 15  --   --    BILITOT 0.3  --   --           Microbiology Results (last 7 days)     ** No results found for the last 168 hours. **           See below for Radiology    Scheduled Med:   albuterol-ipratropium  3 mL Nebulization Q4H    amLODIPine  10 mg Oral Daily    ARIPiprazole  5 mg Oral Daily    ascorbic acid (vitamin C)  500 mg Oral Daily    atorvastatin  10 mg Oral Daily    enoxaparin  40 mg Subcutaneous Daily    fluticasone furoate-vilanteroL  1 puff Inhalation Daily    fluticasone  propionate  1 spray Each Nostril Daily    gabapentin  300 mg Oral TID    lamoTRIgine  25 mg Oral Daily    memantine  5 mg Oral BID    miconazole NITRATE 2 %   Topical (Top) BID    predniSONE  20 mg Oral BID        Continuous Infusions:       PRN Meds:  acetaminophen, acetaminophen, ALPRAZolam, benzonatate, bisacodyL, dextrose 10%, dextrose 10%, glucagon (human recombinant), glucose, glucose, ondansetron, sodium chloride 0.9%       Assessment/Plan:    Unquantified Copd exacerbation w/o bronchitis  htn  Morbid obesity/ obesity hypoventilation syndrome  Hx of cva,   acute on chronic hypoxic hypercapnic respiratory failure  Seasonal allergies  Home O2  chuck on cpap  hld on replacement    Patient continues with persistent hypercapnia with BiPAP use as prescribed during this episode of care.  Volume ventilation is indicated.  Ordering noninvasive volume ventilator with AVAPS-AE for use during hours of sleep as well as during waking hours when symptomatic.  Home BiPAP is not appropriate for meeting this patient's ventilatory requirements.    Ordering noninvasive volume ventilator with AVAPS-AE as opposed to BiPAP with backup rate and AVAPS (BiPAP-AVAPS) for the reasons below:  -with BiPAP-AVAPS flow capability is extremely limited (1/3 of what the NIPPV is capable of achieving).  -NIPPV with AVAPS-AE is preferred due to auto adjusting EPAP function allowing for continued upper airway patency throughout use  -NIPPV with AVAPS-AE has dual prescription modality allowing for seamless transitioned between modes when changes in the patient's clinical condition requires.  -NIPPV is equipped with backup battery allowing for continued use with power outages.      A plan-continue prednisone 20 mg p.o. b.i.d./bipap  at night.nebs/bedside spirometry    Will get spirometry  Awaiting placement            Patient condition:  Guarded/    Anticipated discharge and Disposition:      All diagnosis and differential diagnosis have been  reviewed; assessment and plan has been documented; I have personally reviewed the labs and test results that are presently available; I have reviewed the patients medication list; I have reviewed the consulting providers response and recommendations. I have reviewed or attempted to review medical records based upon their availability    All of the patient's questions have been  addressed and answered. Patient's is agreeable to the above stated plan. I will continue to monitor closely and make adjustments to medical management as needed.  __________________________________________________________________      Radiology:  CV Ultrasound doppler venous DVT leg left  There was no evidence of deep or superficial vein thrombosis in the left   lower extremity.      Mansoor White MD   05/25/2022

## 2022-05-25 NOTE — PT/OT/SLP PROGRESS
Physical Therapy         Treatment        Livia Kennedy   MRN: [id number]     PT Received On: 22  PT Start Time: 904     PT Stop Time: 927    PT Total Time (min): 23 min       Billable Minutes:  Gait [id number] and Therapeutic Activity 13  Total Minutes: 23       PT/PTA: PTA     PTA Visit Number: 2       General Precautions: Standard, fall  Orthopedic Precautions: Orthopedic Precautions : N/A   Braces:      Spiritual, Cultural Beliefs, Catholic Practices, Values that Affect Care: no    Objective:  Patient found in bed upon entry.    Functional Mobility:  Bed Mobility:   Supine to sit: Moderate Assistance   Sit to supine: Moderate Assistance   Rolling: Moderate Assistance   Scooting: Moderate Assistance    Balance:   Static Sit: Min A    Transitional Sit-to-Stand: Mod A   L knee blocked. Pt L knee is hyperextended    Gait Training: Mod A   L knee blocked in stance. Pt. amb 10ft and 8ft with step to gait pattern with hemiwalker. Pt able to assist with advancement of LLE.     Activity Tolerance:  Patient tolerated treatment well    Patient left up in chair with call button in reach and caregiver present.    Assessment:  Livia Kennedy is a 51 y.o. female with a medical diagnosis of COPD exacerbation.     Rehab potential is excellent.    Activity tolerance: Excellent    Discharge recommendations: Discharge Facility/Level of Care Needs: rehabilitation facility     Equipment recommendations:       GOALS:   Multidisciplinary Problems     Physical Therapy Goals        Problem: Physical Therapy    Goal Priority Disciplines Outcome Goal Variances Interventions   Physical Therapy Goal     PT, PT/OT Ongoing, Progressing     Description: Goals to be met by: 22     Patient will increase functional independence with mobility by performin. Supine to sit with MInimal Assistance  2. Sit to supine with MInimal Assistance  3. Sit to stand transfer with Minimal Assistance  4. Bed to chair transfer with Moderate  Assistance using Wiliam-walker  5. Gait  x 15 feet with Moderate Assistance using Wiliam-walker.   6. Wheelchair propulsion x100 feet with Stand-by Assistance using RUE & RLE  7. Stand for 3 minutes with Minimal Assistance using Wiliam-walker                     PLAN:    Patient to be seen daily  to address the above listed problems via gait training, therapeutic activities, therapeutic exercises  Plan of Care expires: 06/13/22  Plan of Care reviewed with: patient, caregiver         5/25/2022

## 2022-05-25 NOTE — PT/OT/SLP PROGRESS
Occupational Therapy   Treatment    Name: Livia Kennedy  MRN: [id number]  Admitting Diagnosis:  COPD exacerbation       Recommendations:     Discharge Recommendations: rehabilitation facility  Discharge Equipment Recommendations:  bath bench?      Assessment:     Livia Kennedy is a 51 y.o. female with a medical diagnosis of COPD exacerbation.  She presents with chronic L sided CVA deficits resulting in max A needed for ADL/mobility tasks. Performance deficits affecting function are weakness, impaired endurance, impaired self care skills, impaired functional mobilty, impaired balance, decreased upper extremity function, decreased lower extremity function, abnormal tone. She demos potential to improve functional status and decrease burden of care, which would improve safety at home and decrease fall risk.    Rehab Prognosis:  Good; patient would benefit from acute skilled OT services to address these deficits and reach maximum level of function.       Plan:     Patient to be seen 5 x/week to address the above listed problems via self-care/home management, therapeutic activities, neuromuscular re-education  · Plan of Care Expires:    · Plan of Care Reviewed with: patient, caregiver    Subjective     Pain/Comfort:  · Pain Rating 1: 0/10    Objective:     Patient found up in chair with PureWick, telemetry upon OT entry to room.    General Precautions: Standard, fall   Orthopedic Precautions:N/A   Braces: N/A  Respiratory Status: Room air     Occupational Performance:     Bed Mobility:    · Patient completed Sit to Supine with maximal assistance     Functional Mobility/Transfers:  · Patient completed Sit <> Stand Transfer with mod-max A x 2 persons  with  hemiwalker and gait belt x 3 trials. difficulty getting LLE into adequate position for WB d/t increased tone with hyperextension of knee.   · Patient completed Bed <> Chair Transfer using Stand Pivot technique with moderate assistance and of 2 persons with gait  belt    Activities of Daily Living:  · Grooming: maximal assistance and of 2 persons to brush teeth standing at sink (set up assist needed to prep toothbrush, pt able to brush teeth with R hand without assist, max A x 2 for unsupported stand at sink). pt yao standing ~2 min during ax with tactile cues for upright posture throughout.   · Upper Body Dressing: maximal assistance to don/doff gown  · Toileting: total assistance incontinent void, total A for riya care      Treatment & Education:  ADL training with emphasis on improved standing balance and tolerance provided. Pt tolerated tx well.    Patient left HOB elevated with all lines intact, call button in reach and sitter presentEducation:      GOALS:   Multidisciplinary Problems     Occupational Therapy Goals        Problem: Occupational Therapy    Goal Priority Disciplines Outcome Interventions   Occupational Therapy Goal     OT, PT/OT Ongoing, Progressing    Description: LTG: Caregiver will be able to safely assist pt with all ADL/transfers with good safety and no falls by d/c.    STG: to be met in 2 weeks, by 6/6  1. Pt will perform UB dressing with Min A.  2. Pt will perform LB with Mod A.  3. Pt will perform grooming in standing at sink >3 min with CGA using ellis-walker.  4. Pt will perform bsc t/f with min A using ellis-walker.  5. Pt will perform toileting with min A using bsc and ellis-walker.  6. Pt will perform tub t/f using HW and TTB.                   Time Tracking:     OT Date of Treatment: 05/25/22  OT Start Time: 1038  OT Stop Time: 1108  OT Total Time (min): 30 min    Billable Minutes:Self Care/Home Management 15 min  Therapeutic Activity 15 min    OT/DAVONTE: OT     DAVONTE Visit Number: 2    5/25/2022

## 2022-05-26 LAB
POCT GLUCOSE: 119 MG/DL (ref 70–110)
POCT GLUCOSE: 149 MG/DL (ref 70–110)

## 2022-05-26 PROCEDURE — 25000003 PHARM REV CODE 250: Performed by: INTERNAL MEDICINE

## 2022-05-26 PROCEDURE — 94660 CPAP INITIATION&MGMT: CPT

## 2022-05-26 PROCEDURE — 63600175 PHARM REV CODE 636 W HCPCS: Performed by: INTERNAL MEDICINE

## 2022-05-26 PROCEDURE — 99900031 HC PATIENT EDUCATION (STAT)

## 2022-05-26 PROCEDURE — 94761 N-INVAS EAR/PLS OXIMETRY MLT: CPT

## 2022-05-26 PROCEDURE — G0378 HOSPITAL OBSERVATION PER HR: HCPCS

## 2022-05-26 PROCEDURE — 27000221 HC OXYGEN, UP TO 24 HOURS

## 2022-05-26 PROCEDURE — 97530 THERAPEUTIC ACTIVITIES: CPT | Mod: CO

## 2022-05-26 PROCEDURE — 99900035 HC TECH TIME PER 15 MIN (STAT)

## 2022-05-26 PROCEDURE — 97116 GAIT TRAINING THERAPY: CPT | Mod: CQ

## 2022-05-26 PROCEDURE — 97110 THERAPEUTIC EXERCISES: CPT | Mod: CQ

## 2022-05-26 PROCEDURE — 96372 THER/PROPH/DIAG INJ SC/IM: CPT | Performed by: INTERNAL MEDICINE

## 2022-05-26 PROCEDURE — 25000242 PHARM REV CODE 250 ALT 637 W/ HCPCS: Performed by: INTERNAL MEDICINE

## 2022-05-26 PROCEDURE — 94640 AIRWAY INHALATION TREATMENT: CPT

## 2022-05-26 RX ADMIN — LAMOTRIGINE 25 MG: 25 TABLET ORAL at 09:05

## 2022-05-26 RX ADMIN — IPRATROPIUM BROMIDE AND ALBUTEROL SULFATE 3 ML: 2.5; .5 SOLUTION RESPIRATORY (INHALATION) at 04:05

## 2022-05-26 RX ADMIN — GABAPENTIN 300 MG: 300 CAPSULE ORAL at 04:05

## 2022-05-26 RX ADMIN — AMLODIPINE BESYLATE 10 MG: 5 TABLET ORAL at 09:05

## 2022-05-26 RX ADMIN — ENOXAPARIN SODIUM 40 MG: 40 INJECTION SUBCUTANEOUS at 06:05

## 2022-05-26 RX ADMIN — MEMANTINE 5 MG: 5 TABLET ORAL at 09:05

## 2022-05-26 RX ADMIN — OXYCODONE HYDROCHLORIDE AND ACETAMINOPHEN 500 MG: 500 TABLET ORAL at 09:05

## 2022-05-26 RX ADMIN — GABAPENTIN 300 MG: 300 CAPSULE ORAL at 08:05

## 2022-05-26 RX ADMIN — FLUTICASONE FUROATE AND VILANTEROL TRIFENATATE 1 PUFF: 200; 25 POWDER RESPIRATORY (INHALATION) at 09:05

## 2022-05-26 RX ADMIN — MICONAZOLE NITRATE 2 % TOPICAL POWDER: at 08:05

## 2022-05-26 RX ADMIN — MEMANTINE 5 MG: 5 TABLET ORAL at 08:05

## 2022-05-26 RX ADMIN — MICONAZOLE NITRATE 2 % TOPICAL POWDER: at 09:05

## 2022-05-26 RX ADMIN — FLUTICASONE PROPIONATE 50 MCG: 50 SPRAY, METERED NASAL at 09:05

## 2022-05-26 RX ADMIN — ARIPIPRAZOLE 5 MG: 5 TABLET ORAL at 09:05

## 2022-05-26 RX ADMIN — IPRATROPIUM BROMIDE AND ALBUTEROL SULFATE 3 ML: 2.5; .5 SOLUTION RESPIRATORY (INHALATION) at 12:05

## 2022-05-26 RX ADMIN — IPRATROPIUM BROMIDE AND ALBUTEROL SULFATE 3 ML: 2.5; .5 SOLUTION RESPIRATORY (INHALATION) at 08:05

## 2022-05-26 RX ADMIN — PREDNISONE 20 MG: 20 TABLET ORAL at 09:05

## 2022-05-26 RX ADMIN — ATORVASTATIN CALCIUM 10 MG: 10 TABLET, FILM COATED ORAL at 06:05

## 2022-05-26 RX ADMIN — GABAPENTIN 300 MG: 300 CAPSULE ORAL at 09:05

## 2022-05-26 RX ADMIN — ALPRAZOLAM 0.25 MG: 0.25 TABLET ORAL at 08:05

## 2022-05-26 NOTE — PT/OT/SLP PROGRESS
Occupational Therapy  Treatment    Livia Kennedy   MRN: [id number]   Admitting Diagnosis: COPD exacerbation    OT Date of Treatment: 05/26/22   OT Start Time: 1107  OT Stop Time: 1130  OT Total Time (min): 23 min     Billable Minutes:  Therapeutic Activity 23  Total Minutes: 23     OT/DAVONTE: DAVONTE     DAVONTE Visit Number: 3    General Precautions: Standard, fall  Orthopedic Precautions:    Braces:      Spiritual, Cultural Beliefs, Restoration Practices, Values that Affect Care: no    Subjective:  Communicated with RN prior to session.    Pain/Comfort  Pain Rating 1: 0/10  Pain Rating Post-Intervention 1: 0/10  Pain Rating 2: 0/10  Pain Rating Post-Intervention 2: 0/10    Objective:  Patient found with: telemetry    Functional Mobility:  Bed Mobility:   Supine to sit: Activity did not occur   Sit to supine: Maximum Assistance   Rolling: Maximum Assistance   Scooting: Maximum Assistance    Transfer Training:   Bed <> Chair:  Stand Pivot with Max A x 2 with Hemiwalker     Balance:   Static Sit: POOR+: Needs MINIMAL assist to maintain  Dynamic Sit:  FAIR: Cannot move trunk without losing balance  Static Stand: POOR: Needs MODERATE assist to maintain  Dynamic stand: POOR: Needs MOD (moderate) assist during gait    Patient left HOB elevated with all lines intact and call button in reach    ASSESSMENT:  Livia Kennedy is a 51 y.o. female with a medical diagnosis of COPD exacerbation and presents with decreased ADL skills, decreased strength and decreased activity tolerance.    Rehab potential is good    Activity tolerance: Good    Discharge recommendations: inpatient rehab or skilled nursing pending progress    Equipment recommendations: walker, rolling     GOALS:   Multidisciplinary Problems     Occupational Therapy Goals        Problem: Occupational Therapy    Goal Priority Disciplines Outcome Interventions   Occupational Therapy Goal     OT, PT/OT Ongoing, Progressing    Description: LTG: Caregiver will be able to safely assist  pt with all ADL/transfers with good safety and no falls by d/c.    STG: to be met in 2 weeks, by 6/6  1. Pt will perform UB dressing with Min A.  2. Pt will perform LB with Mod A.  3. Pt will perform grooming in standing at sink >3 min with CGA using ellis-walker.  4. Pt will perform bsc t/f with min A using ellis-walker.  5. Pt will perform toileting with min A using bsc and ellis-walker.  6. Pt will perform tub t/f using HW and TTB.                   Plan:  Patient to be seen 5 x/week to address the above listed problems via self-care/home management, therapeutic activities, therapeutic exercises, neuromuscular re-education  Plan of Care expires:    Plan of Care reviewed with: patient, spouse         05/26/2022

## 2022-05-27 VITALS
TEMPERATURE: 98 F | HEART RATE: 75 BPM | OXYGEN SATURATION: 93 % | BODY MASS INDEX: 44.92 KG/M2 | RESPIRATION RATE: 18 BRPM | HEIGHT: 67 IN | WEIGHT: 286.19 LBS | DIASTOLIC BLOOD PRESSURE: 73 MMHG | SYSTOLIC BLOOD PRESSURE: 110 MMHG

## 2022-05-27 LAB — POCT GLUCOSE: 108 MG/DL (ref 70–110)

## 2022-05-27 PROCEDURE — 25000242 PHARM REV CODE 250 ALT 637 W/ HCPCS: Performed by: INTERNAL MEDICINE

## 2022-05-27 PROCEDURE — 25000003 PHARM REV CODE 250: Performed by: INTERNAL MEDICINE

## 2022-05-27 PROCEDURE — 94640 AIRWAY INHALATION TREATMENT: CPT

## 2022-05-27 PROCEDURE — 97530 THERAPEUTIC ACTIVITIES: CPT | Mod: CQ

## 2022-05-27 PROCEDURE — 99900035 HC TECH TIME PER 15 MIN (STAT)

## 2022-05-27 PROCEDURE — 94660 CPAP INITIATION&MGMT: CPT

## 2022-05-27 PROCEDURE — G0378 HOSPITAL OBSERVATION PER HR: HCPCS

## 2022-05-27 PROCEDURE — 97530 THERAPEUTIC ACTIVITIES: CPT | Mod: CO

## 2022-05-27 PROCEDURE — 97116 GAIT TRAINING THERAPY: CPT | Mod: CQ

## 2022-05-27 PROCEDURE — 27000221 HC OXYGEN, UP TO 24 HOURS

## 2022-05-27 RX ORDER — ALPRAZOLAM 0.25 MG/1
0.25 TABLET ORAL 2 TIMES DAILY PRN
Qty: 30 TABLET | Refills: 0 | Status: SHIPPED | OUTPATIENT
Start: 2022-05-27

## 2022-05-27 RX ORDER — IPRATROPIUM BROMIDE AND ALBUTEROL SULFATE 2.5; .5 MG/3ML; MG/3ML
3 SOLUTION RESPIRATORY (INHALATION)
Qty: 270 ML | Refills: 11 | Status: SHIPPED | OUTPATIENT
Start: 2022-05-27 | End: 2023-05-27

## 2022-05-27 RX ORDER — FLUTICASONE FUROATE AND VILANTEROL 200; 25 UG/1; UG/1
1 POWDER RESPIRATORY (INHALATION) DAILY
Qty: 1 EACH | Refills: 2 | Status: SHIPPED | OUTPATIENT
Start: 2022-05-28

## 2022-05-27 RX ADMIN — MEMANTINE 5 MG: 5 TABLET ORAL at 09:05

## 2022-05-27 RX ADMIN — IPRATROPIUM BROMIDE AND ALBUTEROL SULFATE 3 ML: 2.5; .5 SOLUTION RESPIRATORY (INHALATION) at 04:05

## 2022-05-27 RX ADMIN — IPRATROPIUM BROMIDE AND ALBUTEROL SULFATE 3 ML: 2.5; .5 SOLUTION RESPIRATORY (INHALATION) at 11:05

## 2022-05-27 RX ADMIN — GABAPENTIN 300 MG: 300 CAPSULE ORAL at 09:05

## 2022-05-27 RX ADMIN — MICONAZOLE NITRATE 2 % TOPICAL POWDER: at 09:05

## 2022-05-27 RX ADMIN — ARIPIPRAZOLE 5 MG: 5 TABLET ORAL at 09:05

## 2022-05-27 RX ADMIN — AMLODIPINE BESYLATE 10 MG: 5 TABLET ORAL at 09:05

## 2022-05-27 RX ADMIN — LAMOTRIGINE 25 MG: 25 TABLET ORAL at 09:05

## 2022-05-27 RX ADMIN — IPRATROPIUM BROMIDE AND ALBUTEROL SULFATE 3 ML: 2.5; .5 SOLUTION RESPIRATORY (INHALATION) at 07:05

## 2022-05-27 RX ADMIN — IPRATROPIUM BROMIDE AND ALBUTEROL SULFATE 3 ML: 2.5; .5 SOLUTION RESPIRATORY (INHALATION) at 12:05

## 2022-05-27 RX ADMIN — OXYCODONE HYDROCHLORIDE AND ACETAMINOPHEN 500 MG: 500 TABLET ORAL at 09:05

## 2022-05-27 RX ADMIN — FLUTICASONE FUROATE AND VILANTEROL TRIFENATATE 1 PUFF: 200; 25 POWDER RESPIRATORY (INHALATION) at 09:05

## 2022-05-27 RX ADMIN — FLUTICASONE PROPIONATE 50 MCG: 50 SPRAY, METERED NASAL at 09:05

## 2022-05-27 NOTE — PROGRESS NOTES
[facility] Medicine Progress Note        Chief Complaint: Inpatient Follow-up for  Copd exacerbation/ acute hypoxic hypercapnic resp. failure    HPI: The patient is a 51-year-old white female with history of tobacco abuse for many years. She was smoking  at one time 2 packs per day and she quit 3 years ago after sustaining a cerebral vascular accident. Since then she has been bed-bound, she lives with family members and she gets sitters. She has history of unquantified COPD as well as multiple seasonal allergies. Patient is on no home oxygen. Patient refers feeling short of breath and decided to come to the emergency room for evaluation where she was found to be hypoxemic and hypercapnic. Patient has been admitted for COPD exacerbation with acute hypoxic/hypercapnic respiratory failure. Even though she refers she has COPD, her COPD has never been quantified. She has history of sleep apnea and uses CPAP. Eventually once she is better will discontinue oxygen and see if she gets  hypoxic to less than 88%. Also once she is better at baseline will do ABGs just see if she is hypercapnic/hypoxic and compensated to see if she qualifies for  trilogy to improve ventilation. The patient was admitted with azithromycin iv /nebulized treatment as well as Solu-Medrol IV. Will contact respiratory for of CPAP at night. At the present moment she is on 2 L nasal cannula with O2 sat around 94%. Patient is morbidly obese and may have a component of obesity hypoventilation syndrome as well.      Interval Hx:     5/22-Azithromycin has been dc, solumedrol iv weaned down. Pt is using cpap w/o issues and today looks great. Will transfer solumedrol to po. Get abg's in am.  Pt voices no complains. Plans for home health w pt upon discharge    5/23-ABGs were done this morning show in compensated a chronic hypoxic/hypercapnic respiratory failure, patient may be a candidate for trilogy.  Though patient has  no official diagnosis of COPD since she has not had PFTs in past    5-24 refers feeling well. Will do bedside spirometry. Awaiting placement; Highly motivated to improve mobilization. Tolerating PT, walking a few steps    5/25- awaiting placement/meantime we are attemtping to get baljeet trilogy. Continues on bipap at night    5/26/22 pt was denied rehab. Will discharge in am w HH w PT        Objective/physical exam:  General: In no acute distress, afebrile. Morbidly obese female in no distress  Chest: Clear to auscultation bilaterally  Heart: RRR, +S1, S2, no appreciable murmur  Abdomen: Soft, nontender, BS +  MSK: Warm, no lower extremity edema, no clubbing or cyanosis  Neurologic: Alert and oriented x4, Cranial nerve II-XII intact, left hemiparesis    VITAL SIGNS: 24 HRS MIN & MAX LAST   Temp  Min: 96.3 °F (35.7 °C)  Max: 98.5 °F (36.9 °C) 96.3 °F (35.7 °C)   BP  Min: 118/76  Max: 145/85 133/86   Pulse  Min: 61  Max: 95  69   Resp  Min: 14  Max: 20 18   SpO2  Min: 91 %  Max: 100 % 100 %       Recent Labs   Lab 05/20/22  1134 05/22/22  0708   WBC 6.7 12.0*   RBC 4.28 4.36   HGB 11.5* 11.6*   HCT 38.0 39.7   MCV 88.8 91.1   MCH 26.9* 26.6*   MCHC 30.3* 29.2*   RDW 15.2 15.0    276   MPV 9.3* 10.0       Recent Labs   Lab 05/20/22  1134 05/22/22  0707 05/23/22  0721    140  --    K 4.2 4.4  --    CO2 30* 24  --    BUN 11.6 22.9*  --    CREATININE 0.74 0.75  --    CALCIUM 10.2 9.7  --    PH  --   --  7.42   ALBUMIN 3.4*  --   --    ALKPHOS 74  --   --    ALT 17  --   --    AST 15  --   --    BILITOT 0.3  --   --           Microbiology Results (last 7 days)     ** No results found for the last 168 hours. **           See below for Radiology    Scheduled Med:   albuterol-ipratropium  3 mL Nebulization Q4H    amLODIPine  10 mg Oral Daily    ARIPiprazole  5 mg Oral Daily    ascorbic acid (vitamin C)  500 mg Oral Daily    atorvastatin  10 mg Oral Daily    enoxaparin  40 mg Subcutaneous Daily    fluticasone  furoate-vilanteroL  1 puff Inhalation Daily    fluticasone propionate  1 spray Each Nostril Daily    gabapentin  300 mg Oral TID    lamoTRIgine  25 mg Oral Daily    memantine  5 mg Oral BID    miconazole NITRATE 2 %   Topical (Top) BID    predniSONE  20 mg Oral BID        Continuous Infusions:       PRN Meds:  acetaminophen, acetaminophen, ALPRAZolam, benzonatate, bisacodyL, dextrose 10%, dextrose 10%, glucagon (human recombinant), glucose, glucose, ondansetron, sodium chloride 0.9%       Assessment/Plan:    Unquantified Copd exacerbation w/o bronchitis  htn  Morbid obesity/ obesity hypoventilation syndrome  Hx of cva,   acute on chronic hypoxic hypercapnic respiratory failure  Seasonal allergies  Home O2  chuck on cpap  hld on replacement    Patient continues with persistent hypercapnia with BiPAP use as prescribed during this episode of care.  Volume ventilation is indicated.  Ordering noninvasive volume ventilator with AVAPS-AE for use during hours of sleep as well as during waking hours when symptomatic.  Home BiPAP is not appropriate for meeting this patient's ventilatory requirements.    Ordering noninvasive volume ventilator with AVAPS-AE as opposed to BiPAP with backup rate and AVAPS (BiPAP-AVAPS) for the reasons below:  -with BiPAP-AVAPS flow capability is extremely limited (1/3 of what the NIPPV is capable of achieving).  -NIPPV with AVAPS-AE is preferred due to auto adjusting EPAP function allowing for continued upper airway patency throughout use  -NIPPV with AVAPS-AE has dual prescription modality allowing for seamless transitioned between modes when changes in the patient's clinical condition requires.  -NIPPV is equipped with backup battery allowing for continued use with power outages.      A plan-dcprednisone 20 mg p.o. b.i.d./ continue bipap  at night.nebs/bedside spirometry done for trilogy                Patient condition: stable    Anticipated discharge and Disposition:      All diagnosis  and differential diagnosis have been reviewed; assessment and plan has been documented; I have personally reviewed the labs and test results that are presently available; I have reviewed the patients medication list; I have reviewed the consulting providers response and recommendations. I have reviewed or attempted to review medical records based upon their availability    All of the patient's questions have been  addressed and answered. Patient's is agreeable to the above stated plan. I will continue to monitor closely and make adjustments to medical management as needed.  __________________________________________________________________      Radiology:  CV Ultrasound doppler venous DVT leg left  There was no evidence of deep or superficial vein thrombosis in the left   lower extremity.      Mansoor White MD   05/26/2022

## 2022-05-27 NOTE — PT/OT/SLP PROGRESS
Physical Therapy         Treatment        Livia Kennedy   MRN: [id number]     PT Received On: 05/27/22  PT Start Time: 0949     PT Stop Time: 1016    PT Total Time (min): 27 min       Billable Minutes:  Gait [id number] and Therapeutic Activity 17  Total Minutes: 27    Treatment Type: Treatment  PT/PTA: PTA     PTA Visit Number: 3       General Precautions: Standard, fall  Orthopedic Precautions: Orthopedic Precautions : N/A   Braces: Braces: AFO    Spiritual, Cultural Beliefs, Worship Practices, Values that Affect Care: no    Objective:  Patient found in bed upon entry, with caregiver in room    Functional Mobility:  Bed Mobility:   Supine to sit: Moderate Assistance   Sit to supine: Moderate Assistance   Rolling: Moderate Assistance   Scooting: Moderate Assistance    Balance:   Static Sit: Min A     Transfer Training: Mod A and SBQC   Pt performed stand step t/f from bed to bedside chair and from bedside chair to w/c    Gait Training:  Patient gait trained 12 and 8  feet on level tile with Small base quad cane with Moderate Assistance. Pt presents with slow mell. Assistance given for advancement of LLE and VC given for posture. Impairments contributing to gait deviations include impaired balance, impaired coordination, impaired motor control, abnormal muscle tone, impaired postural control and decreased strength    Activity Tolerance:  Patient tolerated treatment well    Patient left up in chair with caregiver present.    Assessment:  Livia Kennedy is a 51 y.o. female with a medical diagnosis of COPD exacerbation.     Rehab potential is good.    Activity tolerance: Good    Discharge recommendations: Discharge Facility/Level of Care Needs: rehabilitation facility, home with home health     Equipment recommendations:       GOALS:   Multidisciplinary Problems     Physical Therapy Goals        Problem: Physical Therapy    Goal Priority Disciplines Outcome Goal Variances Interventions   Physical Therapy Goal      PT, PT/OT Ongoing, Progressing     Description: Goals to be met by: 22     Patient will increase functional independence with mobility by performin. Supine to sit with MInimal Assistance  2. Sit to supine with MInimal Assistance  3. Sit to stand transfer with Minimal Assistance  4. Bed to chair transfer with Moderate Assistance using [place]-walker  5. Gait  x 15 feet with Moderate Assistance using [place]am-walker.   6. Wheelchair propulsion x100 feet with Stand-by Assistance using RUE & RLE  7. Stand for 3 minutes with Minimal Assistance using [place]am-walker                     PLAN:    Patient to be seen daily  to address the above listed problems via gait training, therapeutic activities, therapeutic exercises  Plan of Care expires: 22  Plan of Care reviewed with: patient, caregiver         2022

## 2022-05-27 NOTE — PT/OT/SLP PROGRESS
Occupational Therapy  Treatment    Livia Kennedy   MRN: [id number]   Admitting Diagnosis: COPD exacerbation    OT Date of Treatment: 05/27/22   OT Start Time: 1400  OT Stop Time: 1425  OT Total Time (min): 25 min     Billable Minutes:  Therapeutic Activity 25  Total Minutes: 25     OT/DAVONTE: DAVONTE     DAVONTE Visit Number: 4    General Precautions: Standard, fall  Braces: AFO    Spiritual, Cultural Beliefs, Tenriism Practices, Values that Affect Care: no    Subjective:  Communicated with RN prior to session.      Objective:  Patient found with: telemetry    Functional Mobility:    Transfer Training:   Sit to stand:Max A x 2 with gait belt transfer wheelchair to van SPS    Additional Treatment:  Caregiver education    Discharge recommendations: D/C home with caregivers    Equipment recommendations: walker, rolling     GOALS:   Multidisciplinary Problems     Occupational Therapy Goals        Problem: Occupational Therapy    Goal Priority Disciplines Outcome Interventions   Occupational Therapy Goal     OT, PT/OT Ongoing, Progressing    Description: LTG: Caregiver will be able to safely assist pt with all ADL/transfers with good safety and no falls by d/c.    STG: to be met in 2 weeks, by 6/6  1. Pt will perform UB dressing with Min A.  2. Pt will perform LB with Mod A.  3. Pt will perform grooming in standing at sink >3 min with CGA using ellis-walker.  4. Pt will perform bsc t/f with min A using ellis-walker.  5. Pt will perform toileting with min A using bsc and ellis-walker.  6. Pt will perform tub t/f using HW and TTB.                   Plan:  Patient to be seen 5 x/week to address the above listed problems via self-care/home management, therapeutic activities, therapeutic exercises  Plan of Care expires:    Plan of Care reviewed with: patient, caregiver         05/27/2022

## 2022-05-27 NOTE — PT/OT/SLP PROGRESS
Physical Therapy         Treatment        Livia Kennedy   MRN: [id number]     PT Received On: 05/26/22  PT Start Time: 0945     PT Stop Time: 1011    PT Total Time (min): 26 min       Billable Minutes:  Gait [id number] and Therapeutic Activity 10  Total Minutes: 26       PT/PTA: PTA     PTA Visit Number: 2, 1       General Precautions: Standard, fall  Orthopedic Precautions: Orthopedic Precautions : N/A   Braces:      Spiritual, Cultural Beliefs, Advent Practices, Values that Affect Care: no    Objective:  Patient found in bed upon entry, with caregiver in room.     Functional Mobility:  Bed Mobility:              Supine to sit: Moderate Assistance              Sit to supine: Moderate Assistance              Rolling: Moderate Assistance              Scooting: Moderate Assistance     Balance:   Static Sit: Min A     Transitional Sit-to-stand: Mod A with SBQC     Gait Training:  Patient gait trained 10 feet on level tile with Small base quad cane with Moderate Assistance. VC's for sequencing and assistance for advancement and placement of LLE. Pt presents with hyperextension of LLE. Impairments contributing to gait deviations include impaired balance, impaired coordination, impaired motor control, abnormal muscle tone and decreased strength     Additional Treatment:     Therapeutic Exercises              Pt's L foot placed on pillow case and pt assisted performing knee flexion x 10 reps.      Strengthening/Endurance              Pt performed sit-to-stand X 5 reps from bedside chair.      Activity Tolerance:  Patient tolerated treatment well     Patient left up in chair with caregiver present.     Assessment:  Sera Julien is a 89 y.o. female with a medical diagnosis of Dizziness.      Rehab potential is good.     Activity tolerance: Good     Discharge recommendations: Discharge Facility/Level of Care Needs: nursing facility, basic, rehabilitation facility      Equipment recommendations:        GOALS:        Multidisciplinary Problems           Physical Therapy Goals                 Problem: Physical Therapy      Goal Priority Disciplines Outcome Goal Variances Interventions   Physical Therapy Goal      PT, PT/OT Ongoing, Progressing       Description: Goals to be met by: 2022      Patient will increase functional independence with mobility by performin. Supine to sit with Modified [place]  2. Sit to supine with Modified [place]  3. Rolling to Left and Right with Modified [place].  4. Sitting at edge of bed 5 minutes with Stand-by Assistance                           PLAN:    Patient to be seen daily  to address the above listed problems via gait training, therapeutic activities, therapeutic exercises  Plan of Care expires: 22  Plan of Care reviewed with: patient, friend        2022

## 2022-05-27 NOTE — PLAN OF CARE
05/27/22 1327   Final Note   Assessment Type Final Discharge Note   Anticipated Discharge Disposition Home-Health   Hospital Resources/Appts/Education Provided Post-Acute resouces added to AVS   Post-Acute Status   Post-Acute Authorization Home Health;HME   Post-Acute Placement Status Set-up Complete/Auth obtained   HME Status Set-up Complete/Auth obtained  (Trilogy per [facility])   Home Health Status Set-up Complete/Auth obtained  (NSI assigned per medicaid rotation)

## 2022-05-27 NOTE — DISCHARGE SUMMARY
[facility] Hospital Medicine Discharge Summary    Admit Date: 5/20/2022  Discharge Date and Time: 5/27/20228:51 AM  Admitting Physician: [unfilled]  Discharging Physician: Mansoor White MD.  Primary Care Physician: Jeannine Zhao NP  Consults: None    Discharge Diagnoses:  Unquantified Copd exacerbation w/o bronchitis  -moderately severe obstruction by pft   htn   Morbid obesity/ obesity hypoventilation syndrome   Hx of cva,   acute on chronic hypoxic hypercapnic respiratory failure   Seasonal allergies   Home O2   chuck on cpap   hld on replacement           Hospital Course:   The patient is a 51-year-old white female with a previous  history of tobacco abuse for many years. She was smoking at one time 2 packs per day and she quit 3 years ago after sustaining a cerebral vascular accident. Since then she has been bed-bound, she lives with family members and she gets sitters. She has history of unquantified COPD as well as multiple seasonal allergies. Patient is on no home oxygen. Patient refers feeling short of breath and decided to come to the emergency room for evaluation where she was found to be hypoxemic and hypercapnic. Patient has been admitted for COPD exacerbation with acute hypoxic/hypercapnic respiratory failure. Even though she refers she has COPD, her COPD has never been quantified. She has history of sleep apnea and uses CPAP. Eventually once she is better will discontinue oxygen and see if she gets hypoxic to less than 88%. Also once she is better at baseline will do ABGs just see if she is hypercapnic/hypoxic and compensated to see if she qualifies for trilogy to improve ventilation. The patient was admitted with azithromycin iv /nebulized treatment as well as Solu-Medrol IV. We contacted respiratory for of bipap . At the present moment she is on 2 L nasal cannula with O2 sat around 94%. Patient is morbidly obese and may have a component of obesity hypoventilation  syndrome as well.     Pt has no signs of bronchitis and Azithromycin was dc, solumedrol iv weaned down. Pt is using bipap at night  w/o issues and  O2 during the day ,today looks great.  solumedrol  Transferred to po prednisone 20 mgs bid x 3 days. Abg's with compensated ph but still hypercapnic and hypoxic. Spirometry was done at bedside.   was contacted for trilogy and view med contacted. Pt wanted to do physical therapy and she was able to stand at bedside and give a couple of steps.  was contacted for rehab according to PT recs but insurance denied. Pt will caryn discharge ghome with home health and PT.   To set up all this and we are still pending approval of [facility].     Patient continues with persistent hypercapnia with BiPAP use as prescribed during this episode of care. Volume ventilation is indicated. Ordering noninvasive volume ventilator with AVAPS-AE for use during hours of sleep as well as during waking hours when symptomatic. Home BiPAP is not appropriate for meeting this patient's ventilatory requirements.   Ordering noninvasive volume ventilator with AVAPS-AE as opposed to BiPAP with backup rate and AVAPS (BiPAP-AVAPS) for the reasons below:   -with BiPAP-AVAPS flow capability is extremely limited (1/3 of what the NIPPV is capable of achieving).   -NIPPV with AVAPS-AE is preferred due to auto adjusting EPAP function allowing for continued upper airway patency throughout use   -NIPPV with AVAPS-AE has dual prescription modality allowing for seamless transitioned between modes when changes in the patient's clinical condition requires.   -NIPPV is equipped with backup battery allowing for continued use with power outages.   A plan-dcprednisone 20 mg p.o. b.i.d./ continue bipap at night.nebs/bedside spirometry done for trilogy    Pt was seen and examined on the day of discharge  Vitals:  VITAL SIGNS: 24 HRS MIN & MAX LAST   Temp  Min: 96.3 °F (35.7 °C)  Max: 98.2 °F  (36.8 °C) 97.6 °F (36.4 °C)   BP  Min: 107/68  Max: 137/88 124/89   Pulse  Min: 62  Max: 79  76   Resp  Min: 15  Max: 21 18   SpO2  Min: 93 %  Max: 100 % 95 %       Physical Exam:  General: In no acute distress, afebrile. Morbidly obese female in no distress   Chest: Clear to auscultation bilaterally   Heart: RRR, +S1, S2, no appreciable murmur   Abdomen: Soft, nontender, BS +   MSK: Warm, no lower extremity edema, no clubbing or cyanosis   Neurologic: Alert and oriented x4, Cranial nerve II-XII intact, left hemiparesis      Procedures Performed: No admission procedures for hospital encounter.     Significant Diagnostic Studies: See Full reports for all details    Recent Labs   Lab 05/20/22  1134 05/22/22  0708   WBC 6.7 12.0*   RBC 4.28 4.36   HGB 11.5* 11.6*   HCT 38.0 39.7   MCV 88.8 91.1   MCH 26.9* 26.6*   MCHC 30.3* 29.2*   RDW 15.2 15.0    276   MPV 9.3* 10.0       Recent Labs   Lab 05/20/22  1134 05/22/22  0707 05/23/22  0721    140  --    K 4.2 4.4  --    CO2 30* 24  --    BUN 11.6 22.9*  --    CREATININE 0.74 0.75  --    CALCIUM 10.2 9.7  --    PH  --   --  7.42   ALBUMIN 3.4*  --   --    ALKPHOS 74  --   --    ALT 17  --   --    AST 15  --   --    BILITOT 0.3  --   --         Microbiology Results (last 7 days)     ** No results found for the last 168 hours. **           CV Ultrasound doppler venous DVT leg left  There was no evidence of deep or superficial vein thrombosis in the left   lower extremity.         Medication List      ASK your doctor about these medications    albuterol 5 mg/mL nebulizer solution  Commonly known as: PROVENTIL     alendronate 70 MG tablet  Commonly known as: FOSAMAX     amLODIPine 10 MG tablet  Commonly known as: NORVASC     ARIPiprazole 5 MG Tab  Commonly known as: ABILIFY     ascorbic acid (vitamin C) 500 MG tablet  Commonly known as: VITAMIN C     atorvastatin 10 MG tablet  Commonly known as: LIPITOR     bisacodyL 5 mg EC tablet  Commonly known as: DULCOLAX      calcium carbonate 600 mg calcium (1,500 mg) Tab  Commonly known as: OS-GARFIELD     fluticasone propionate 50 mcg/actuation nasal spray  Commonly known as: FLONASE     gabapentin 300 MG capsule  Commonly known as: NEURONTIN     lamoTRIgine 25 MG tablet  Commonly known as: LAMICTAL     memantine 10 MG Tab  Commonly known as: NAMENDA     nitrofurantoin (macrocrystal-monohydrate) 100 MG capsule  Commonly known as: MACROBID     pantoprazole 40 MG tablet  Commonly known as: PROTONIX             Explained in detail to the patient about the discharge plan, medications, and follow-up visits. Pt understands and agrees with the treatment plan  Discharge Disposition:    Discharged Condition: stable  Diet-   Dietary Orders (From admission, onward)     Start     Ordered    05/20/22 1718  Diet Adult Regular  (Diet/Nutrition [facility])  Diet effective now         05/20/22 1718               Medications Per DC med rec  Activities as tolerated    For further questions contact hospitalist office    Discharge time 33 minutes    For worsening symptoms, chest pain, shortness of breath, increased abdominal pain, high grade fever, stroke or stroke like symptoms, immediately go to the nearest Emergency Room or call 911 as soon as possible.      Mansoor Hoang M.D, on 5/27/2022. at 8:51 AM.

## 2022-06-22 ENCOUNTER — HOSPITAL ENCOUNTER (EMERGENCY)
Facility: HOSPITAL | Age: 52
Discharge: HOME | End: 2022-06-22
Attending: EMERGENCY MEDICINE
Payer: MEDICAID

## 2022-06-22 VITALS
TEMPERATURE: 98 F | WEIGHT: 286 LBS | OXYGEN SATURATION: 96 % | SYSTOLIC BLOOD PRESSURE: 148 MMHG | DIASTOLIC BLOOD PRESSURE: 89 MMHG | BODY MASS INDEX: 44.89 KG/M2 | HEART RATE: 72 BPM | RESPIRATION RATE: 18 BRPM | HEIGHT: 67 IN

## 2022-06-22 DIAGNOSIS — M79.89 LEG SWELLING: Primary | ICD-10-CM

## 2022-06-22 DIAGNOSIS — M79.89 LEFT LEG SWELLING: ICD-10-CM

## 2022-06-22 LAB
ALBUMIN SERPL-MCNC: 3.5 GM/DL (ref 3.5–5)
ALBUMIN/GLOB SERPL: 1.2 RATIO (ref 1.1–2)
ALP SERPL-CCNC: 64 UNIT/L (ref 40–150)
ALT SERPL-CCNC: 18 UNIT/L (ref 0–55)
APTT PPP: 34.1 SECONDS (ref 23.2–33.7)
AST SERPL-CCNC: 17 UNIT/L (ref 5–34)
BASOPHILS # BLD AUTO: 0.05 X10(3)/MCL (ref 0–0.2)
BASOPHILS NFR BLD AUTO: 0.8 %
BILIRUBIN DIRECT+TOT PNL SERPL-MCNC: 0.3 MG/DL
BUN SERPL-MCNC: 15 MG/DL (ref 9.8–20.1)
CALCIUM SERPL-MCNC: 9.4 MG/DL (ref 8.4–10.2)
CHLORIDE SERPL-SCNC: 104 MMOL/L (ref 98–107)
CO2 SERPL-SCNC: 29 MMOL/L (ref 22–29)
CREAT SERPL-MCNC: 0.79 MG/DL (ref 0.55–1.02)
EOSINOPHIL # BLD AUTO: 0.17 X10(3)/MCL (ref 0–0.9)
EOSINOPHIL NFR BLD AUTO: 2.6 %
ERYTHROCYTE [DISTWIDTH] IN BLOOD BY AUTOMATED COUNT: 15.6 % (ref 11.5–17)
GLOBULIN SER-MCNC: 2.9 GM/DL (ref 2.4–3.5)
GLUCOSE SERPL-MCNC: 96 MG/DL (ref 74–100)
HCT VFR BLD AUTO: 36.4 % (ref 37–47)
HGB BLD-MCNC: 10.7 GM/DL (ref 12–16)
IMM GRANULOCYTES # BLD AUTO: 0.05 X10(3)/MCL (ref 0–0.02)
IMM GRANULOCYTES NFR BLD AUTO: 0.8 % (ref 0–0.43)
INR BLD: 0.97 (ref 0–1.3)
LYMPHOCYTES # BLD AUTO: 1.66 X10(3)/MCL (ref 0.6–4.6)
LYMPHOCYTES NFR BLD AUTO: 25.2 %
MCH RBC QN AUTO: 27.4 PG (ref 27–31)
MCHC RBC AUTO-ENTMCNC: 29.4 MG/DL (ref 33–36)
MCV RBC AUTO: 93.1 FL (ref 80–94)
MONOCYTES # BLD AUTO: 0.55 X10(3)/MCL (ref 0.1–1.3)
MONOCYTES NFR BLD AUTO: 8.4 %
NEUTROPHILS # BLD AUTO: 4.1 X10(3)/MCL (ref 2.1–9.2)
NEUTROPHILS NFR BLD AUTO: 62.2 %
NRBC BLD AUTO-RTO: 0 %
PLATELET # BLD AUTO: 213 X10(3)/MCL (ref 130–400)
PMV BLD AUTO: 9.5 FL (ref 9.4–12.4)
POTASSIUM SERPL-SCNC: 4.7 MMOL/L (ref 3.5–5.1)
PROT SERPL-MCNC: 6.4 GM/DL (ref 6.4–8.3)
PROTHROMBIN TIME: 12.8 SECONDS (ref 12.5–14.5)
RBC # BLD AUTO: 3.91 X10(6)/MCL (ref 4.2–5.4)
SODIUM SERPL-SCNC: 140 MMOL/L (ref 136–145)
WBC # SPEC AUTO: 6.6 X10(3)/MCL (ref 4.5–11.5)

## 2022-06-22 PROCEDURE — 85730 THROMBOPLASTIN TIME PARTIAL: CPT | Performed by: EMERGENCY MEDICINE

## 2022-06-22 PROCEDURE — 85610 PROTHROMBIN TIME: CPT | Performed by: EMERGENCY MEDICINE

## 2022-06-22 PROCEDURE — 80053 COMPREHEN METABOLIC PANEL: CPT | Performed by: EMERGENCY MEDICINE

## 2022-06-22 PROCEDURE — 36415 COLL VENOUS BLD VENIPUNCTURE: CPT | Performed by: EMERGENCY MEDICINE

## 2022-06-22 PROCEDURE — 85025 COMPLETE CBC W/AUTO DIFF WBC: CPT | Performed by: EMERGENCY MEDICINE

## 2022-06-22 PROCEDURE — 63600175 PHARM REV CODE 636 W HCPCS: Performed by: EMERGENCY MEDICINE

## 2022-06-22 PROCEDURE — 96374 THER/PROPH/DIAG INJ IV PUSH: CPT

## 2022-06-22 PROCEDURE — 99284 EMERGENCY DEPT VISIT MOD MDM: CPT | Mod: 25

## 2022-06-22 RX ORDER — FUROSEMIDE 10 MG/ML
20 INJECTION INTRAMUSCULAR; INTRAVENOUS
Status: COMPLETED | OUTPATIENT
Start: 2022-06-22 | End: 2022-06-22

## 2022-06-22 RX ORDER — FUROSEMIDE 20 MG/1
20 TABLET ORAL DAILY
Qty: 30 TABLET | Refills: 0 | Status: SHIPPED | OUTPATIENT
Start: 2022-06-22 | End: 2022-07-02

## 2022-06-22 RX ADMIN — FUROSEMIDE 20 MG: 10 INJECTION, SOLUTION INTRAMUSCULAR; INTRAVENOUS at 07:06

## 2022-06-22 NOTE — ED PROVIDER NOTES
Encounter Date: 6/22/2022    SCRIBE #1 NOTE: I, Noah Zaragoza, am scribing for, and in the presence of,  El Grace MD. I have scribed the following portions of the note - Other sections scribed: HPI, ROS, physical exam.       History     Chief Complaint   Patient presents with    Leg Swelling     Left leg swelling and discoloration noticed by caretaker today. Left leg cool to touch. Faint DP pulse in the left foot. No injury or trauma. No blood thinner use. Hx of CVA 4 years. Moves around very little at home. 90% on RA with EMS. Hx of COPD. Put on 2L.     50 y/o female with a history of CVA, COPD, and HTN presenting to the ED for LLE swelling onset today. Pt states it is painful as well. She denies any associated abdominal pain, CP, headache, or SOB. She denies any injury to her LLE. Pt has a residual L-sided deficit from a previous CVA which causes her to be wheelchair bound at baseline.     Pt's PCP is Nazia Zhao NP.    The history is provided by the patient. No  was used.   Leg Pain   There was no injury mechanism. The incident occurred today. The pain is present in the left leg. The pain has been worsening since onset. Pertinent negatives include no numbness. Associated symptoms comments: Swelling . She reports no foreign bodies present. Nothing aggravates the symptoms. She has tried nothing for the symptoms. The treatment provided no relief.     Review of patient's allergies indicates:   Allergen Reactions    Sulfamethoxazole-trimethoprim Hives and Other (See Comments)     Other reaction(s): Other (See Comments)     Past Medical History:   Diagnosis Date    COPD exacerbation 05/21/2022    Hypertension     Sleep apnea     Stroke      History reviewed. No pertinent surgical history.  Family History   Family history unknown: Yes     Social History     Tobacco Use    Smoking status: Former Smoker     Packs/day: 2.00     Types: Cigarettes     Quit date: 2019     Years since  quitting: 3.4    Smokeless tobacco: Former User     Quit date: 2019    Tobacco comment: quit smoking 3 yrs ago   Substance Use Topics    Alcohol use: Never    Drug use: Never     Review of Systems   Constitutional: Negative for chills, fatigue and fever.   HENT: Negative for congestion and sore throat.    Eyes: Negative for visual disturbance.   Respiratory: Negative for cough and shortness of breath.    Cardiovascular: Positive for leg swelling (LLE w/ pain). Negative for chest pain.   Gastrointestinal: Negative for abdominal pain, diarrhea, nausea and vomiting.   Genitourinary: Negative for dysuria.   Musculoskeletal: Negative for myalgias.   Skin: Negative for rash.   Neurological: Negative for weakness, numbness and headaches.   All other systems reviewed and are negative.      Physical Exam     Initial Vitals [06/22/22 1723]   BP Pulse Resp Temp SpO2   (!) 111/53 75 16 97.5 °F (36.4 °C) (!) 91 %      MAP       --         Physical Exam    Nursing note and vitals reviewed.  Constitutional: No distress.   HENT:   Head: Normocephalic and atraumatic.   Right Ear: Tympanic membrane normal.   Left Ear: Tympanic membrane normal.   Mouth/Throat: Oropharynx is clear and moist.   Eyes: Conjunctivae and EOM are normal. Pupils are equal, round, and reactive to light.   Neck: Trachea normal. Neck supple. Carotid bruit is not present. No JVD present.   Normal range of motion.  Cardiovascular: Normal rate and regular rhythm.   No murmur heard.  Pulses:       Dorsalis pedis pulses are 2+ on the right side and 2+ on the left side.        Posterior tibial pulses are 2+ on the right side and 2+ on the left side.   Pulmonary/Chest: Breath sounds normal. No respiratory distress. She exhibits no tenderness.   Abdominal: Abdomen is soft. Bowel sounds are normal. She exhibits no distension. There is no abdominal tenderness.   Musculoskeletal:      Cervical back: Normal range of motion and neck supple.      Lumbar back: Normal.  Normal range of motion.      Comments: LLE swollen from lower thigh to foot. Non-tender, no significant erythema.     Neurological: She is alert and oriented to person, place, and time. No cranial nerve deficit or sensory deficit.   L hemiparesis   Psychiatric: She has a normal mood and affect.         ED Course   Procedures  Labs Reviewed   APTT - Abnormal; Notable for the following components:       Result Value    PTT 34.1 (*)     All other components within normal limits   CBC WITH DIFFERENTIAL - Abnormal; Notable for the following components:    RBC 3.91 (*)     Hgb 10.7 (*)     Hct 36.4 (*)     MCHC 29.4 (*)     IG# 0.05 (*)     IG% 0.8 (*)     All other components within normal limits   PROTIME-INR - Normal   COMPREHENSIVE METABOLIC PANEL   CBC W/ AUTO DIFFERENTIAL    Narrative:     The following orders were created for panel order CBC auto differential.  Procedure                               Abnormality         Status                     ---------                               -----------         ------                     CBC with Differential[[id number]]        Abnormal            Final result                 Please view results for these tests on the individual orders.          Imaging Results    None          Medications - No data to display  Medical Decision Making:   Differential Diagnosis:   DVT, edema  Clinical Tests:   Lab Tests: Reviewed and Ordered  Radiological Study: Ordered and Reviewed  ED Management:  Patient with left leg swelling, which is her paralyzed leg from her prior CVA.  Her ultrasound reveals no DVT.  Her labs are otherwise unremarkable.  Will start the patient on a low-dose diuretic for the lymphedema.          Scribe Attestation:   Scribe #1: I performed the above scribed service and the documentation accurately describes the services I performed. I attest to the accuracy of the note.    Attending Attestation:           Physician Attestation for Scribe:  Physician Attestation  Statement for Scribe #1: I, El Grace MD, reviewed documentation, as scribed by Noah Zaragoza in my presence, and it is both accurate and complete.             ED Course as of 06/22/22 1919 Wed Jun 22, 2022   1840 Negative for DVT per verbal report from US tech [KH]      ED Course User Index  [KH] Noah Zaragoza             Clinical Impression:   Final diagnoses:  [M79.89] Left leg swelling  [M79.89] Leg swelling (Primary)          ED Disposition Condition    Discharge Stable        ED Prescriptions     Medication Sig Dispense Start Date End Date Auth. Provider    furosemide (LASIX) 20 MG tablet Take 1 tablet (20 mg total) by mouth once daily. for 10 days 30 tablet 6/22/2022 7/2/2022 El Grace MD        Follow-up Information     Follow up With Specialties Details Why Contact Info    Jeannine Zhao NP Internal Medicine In 2 days  [address] [facility] [address]  [phone]      [facility] Dept Emergency Medicine Go to  If symptoms worsen, As needed [facility] [place] [id number] [phone]           El Grace MD  06/22/22 1919

## 2022-06-22 NOTE — ED NOTES
Pt presents to er c/o pain and swellling to left lower extremity starting today.  Pt states hx of CVA 4 years ago left sided paralysis.  2+ pedal pulses with doppler.  2+ pitting edema to left lower extremity.   Pt denies SOB.   Pt is aaox4 iv established, pt changed into gown, attached to cardiac monitor.

## 2022-06-23 NOTE — ED NOTES
Called Jared for ride they said eta is 1 hour, spoke with  Brian at [phone] regarding d/c plan and will update so when jared arrives

## 2022-06-30 ENCOUNTER — HOSPITAL ENCOUNTER (EMERGENCY)
Facility: HOSPITAL | Age: 52
Discharge: HOME | End: 2022-06-30
Attending: FAMILY MEDICINE
Payer: MEDICAID

## 2022-06-30 VITALS
RESPIRATION RATE: 20 BRPM | BODY MASS INDEX: 54 KG/M2 | HEART RATE: 92 BPM | DIASTOLIC BLOOD PRESSURE: 88 MMHG | HEIGHT: 61 IN | OXYGEN SATURATION: 96 % | SYSTOLIC BLOOD PRESSURE: 126 MMHG | WEIGHT: 286 LBS | TEMPERATURE: 98 F

## 2022-06-30 DIAGNOSIS — J44.1 COPD EXACERBATION: Primary | ICD-10-CM

## 2022-06-30 LAB
ALBUMIN SERPL-MCNC: 3.7 GM/DL (ref 3.5–5)
ALBUMIN/GLOB SERPL: 0.9 RATIO (ref 1.1–2)
ALP SERPL-CCNC: 68 UNIT/L (ref 40–150)
ALT SERPL-CCNC: 13 UNIT/L (ref 0–55)
AST SERPL-CCNC: 12 UNIT/L (ref 5–34)
BASOPHILS # BLD AUTO: 0.04 X10(3)/MCL (ref 0–0.2)
BASOPHILS NFR BLD AUTO: 0.6 %
BILIRUBIN DIRECT+TOT PNL SERPL-MCNC: 0.5 MG/DL
BNP BLD-MCNC: <10 PG/ML
BUN SERPL-MCNC: 14.2 MG/DL (ref 9.8–20.1)
CALCIUM SERPL-MCNC: 10.6 MG/DL (ref 8.4–10.2)
CHLORIDE SERPL-SCNC: 98 MMOL/L (ref 98–107)
CO2 SERPL-SCNC: 33 MMOL/L (ref 22–29)
CREAT SERPL-MCNC: 0.88 MG/DL (ref 0.55–1.02)
EOSINOPHIL # BLD AUTO: 0.05 X10(3)/MCL (ref 0–0.9)
EOSINOPHIL NFR BLD AUTO: 0.8 %
ERYTHROCYTE [DISTWIDTH] IN BLOOD BY AUTOMATED COUNT: 15.9 % (ref 11.5–17)
FLUAV AG UPPER RESP QL IA.RAPID: NOT DETECTED
FLUBV AG UPPER RESP QL IA.RAPID: NOT DETECTED
GLOBULIN SER-MCNC: 4 GM/DL (ref 2.4–3.5)
GLUCOSE SERPL-MCNC: 122 MG/DL (ref 74–100)
HCT VFR BLD AUTO: 38.7 % (ref 37–47)
HGB BLD-MCNC: 11.8 GM/DL (ref 12–16)
IMM GRANULOCYTES # BLD AUTO: 0.02 X10(3)/MCL (ref 0–0.02)
IMM GRANULOCYTES NFR BLD AUTO: 0.3 % (ref 0–0.43)
LYMPHOCYTES # BLD AUTO: 1 X10(3)/MCL (ref 0.6–4.6)
LYMPHOCYTES NFR BLD AUTO: 15.4 %
MCH RBC QN AUTO: 27.4 PG (ref 27–31)
MCHC RBC AUTO-ENTMCNC: 30.5 MG/DL (ref 33–36)
MCV RBC AUTO: 90 FL (ref 80–94)
MONOCYTES # BLD AUTO: 0.55 X10(3)/MCL (ref 0.1–1.3)
MONOCYTES NFR BLD AUTO: 8.4 %
NEUTROPHILS # BLD AUTO: 4.9 X10(3)/MCL (ref 2.1–9.2)
NEUTROPHILS NFR BLD AUTO: 74.5 %
NRBC BLD AUTO-RTO: 0 %
PLATELET # BLD AUTO: 220 X10(3)/MCL (ref 130–400)
PMV BLD AUTO: 9.4 FL (ref 7.4–10.4)
POTASSIUM SERPL-SCNC: 4.4 MMOL/L (ref 3.5–5.1)
PROT SERPL-MCNC: 7.7 GM/DL (ref 6.4–8.3)
RBC # BLD AUTO: 4.3 X10(6)/MCL (ref 4.2–5.4)
SARS-COV-2 RNA RESP QL NAA+PROBE: NOT DETECTED
SODIUM SERPL-SCNC: 139 MMOL/L (ref 136–145)
TROPONIN I SERPL-MCNC: <0.01 NG/ML (ref 0–0.04)
WBC # SPEC AUTO: 6.5 X10(3)/MCL (ref 4.5–11.5)

## 2022-06-30 PROCEDURE — 87636 SARSCOV2 & INF A&B AMP PRB: CPT | Performed by: FAMILY MEDICINE

## 2022-06-30 PROCEDURE — 36415 COLL VENOUS BLD VENIPUNCTURE: CPT | Performed by: FAMILY MEDICINE

## 2022-06-30 PROCEDURE — 94640 AIRWAY INHALATION TREATMENT: CPT

## 2022-06-30 PROCEDURE — 94761 N-INVAS EAR/PLS OXIMETRY MLT: CPT

## 2022-06-30 PROCEDURE — 25000242 PHARM REV CODE 250 ALT 637 W/ HCPCS: Performed by: FAMILY MEDICINE

## 2022-06-30 PROCEDURE — 83880 ASSAY OF NATRIURETIC PEPTIDE: CPT | Performed by: FAMILY MEDICINE

## 2022-06-30 PROCEDURE — 84484 ASSAY OF TROPONIN QUANT: CPT | Performed by: FAMILY MEDICINE

## 2022-06-30 PROCEDURE — 80053 COMPREHEN METABOLIC PANEL: CPT | Performed by: FAMILY MEDICINE

## 2022-06-30 PROCEDURE — 99291 CRITICAL CARE FIRST HOUR: CPT | Mod: 25

## 2022-06-30 PROCEDURE — 63600175 PHARM REV CODE 636 W HCPCS: Performed by: FAMILY MEDICINE

## 2022-06-30 PROCEDURE — 96374 THER/PROPH/DIAG INJ IV PUSH: CPT

## 2022-06-30 PROCEDURE — 85025 COMPLETE CBC W/AUTO DIFF WBC: CPT | Performed by: FAMILY MEDICINE

## 2022-06-30 RX ORDER — IPRATROPIUM BROMIDE AND ALBUTEROL SULFATE 2.5; .5 MG/3ML; MG/3ML
3 SOLUTION RESPIRATORY (INHALATION)
Status: COMPLETED | OUTPATIENT
Start: 2022-06-30 | End: 2022-06-30

## 2022-06-30 RX ORDER — DEXAMETHASONE SODIUM PHOSPHATE 4 MG/ML
8 INJECTION, SOLUTION INTRA-ARTICULAR; INTRALESIONAL; INTRAMUSCULAR; INTRAVENOUS; SOFT TISSUE
Status: COMPLETED | OUTPATIENT
Start: 2022-06-30 | End: 2022-06-30

## 2022-06-30 RX ORDER — IPRATROPIUM BROMIDE AND ALBUTEROL SULFATE 2.5; .5 MG/3ML; MG/3ML
3 SOLUTION RESPIRATORY (INHALATION)
Status: DISCONTINUED | OUTPATIENT
Start: 2022-06-30 | End: 2022-06-30

## 2022-06-30 RX ORDER — METHYLPREDNISOLONE 4 MG/1
TABLET ORAL
Qty: 21 EACH | Refills: 0 | Status: SHIPPED | OUTPATIENT
Start: 2022-06-30

## 2022-06-30 RX ORDER — LEVOFLOXACIN 750 MG/1
750 TABLET ORAL DAILY
Qty: 7 TABLET | Refills: 0 | Status: SHIPPED | OUTPATIENT
Start: 2022-06-30 | End: 2022-07-07

## 2022-06-30 RX ADMIN — IPRATROPIUM BROMIDE AND ALBUTEROL SULFATE 3 ML: 2.5; .5 SOLUTION RESPIRATORY (INHALATION) at 01:06

## 2022-06-30 RX ADMIN — IPRATROPIUM BROMIDE AND ALBUTEROL SULFATE 3 ML: 2.5; .5 SOLUTION RESPIRATORY (INHALATION) at 12:06

## 2022-06-30 RX ADMIN — DEXAMETHASONE SODIUM PHOSPHATE 8 MG: 4 INJECTION, SOLUTION INTRA-ARTICULAR; INTRALESIONAL; INTRAMUSCULAR; INTRAVENOUS; SOFT TISSUE at 01:06

## 2022-06-30 RX ADMIN — IPRATROPIUM BROMIDE AND ALBUTEROL SULFATE 3 ML: 2.5; .5 SOLUTION RESPIRATORY (INHALATION) at 02:06

## 2022-06-30 NOTE — ED PROVIDER NOTES
Encounter Date: 6/30/2022       History     Chief Complaint   Patient presents with    Shortness of Breath     Sob onset last night. Cough copd better after tx. 95%on room air     51-year-old female with history of COPD, hypertension, sleep apnea, CVA, morbid obesity presents with wheezing and dry cough for the past 1 day.  Patient has been compliant with all of her medications including nebulizer at home.  She denies fever or sick contacts.  Denies chest pain.  No other complaints.        Review of patient's allergies indicates:   Allergen Reactions    Sulfamethoxazole-trimethoprim Hives and Other (See Comments)     Other reaction(s): Other (See Comments)     Past Medical History:   Diagnosis Date    COPD exacerbation 05/21/2022    Hypertension     Sleep apnea     Stroke      No past surgical history on file.  Family History   Family history unknown: Yes     Social History     Tobacco Use    Smoking status: Former Smoker     Packs/day: 2.00     Types: Cigarettes     Quit date: 2019     Years since quitting: 3.4    Smokeless tobacco: Former User     Quit date: 2019    Tobacco comment: quit smoking 3 yrs ago   Substance Use Topics    Alcohol use: Never    Drug use: Never     Review of Systems   Constitutional: Negative.    HENT: Negative.    Eyes: Negative.    Respiratory: Positive for cough and wheezing.    Cardiovascular: Negative.    Gastrointestinal: Negative.    Endocrine: Negative.    Genitourinary: Negative.    Musculoskeletal: Negative.    Skin: Negative.    Allergic/Immunologic: Negative.    Neurological: Negative.    Hematological: Negative.    Psychiatric/Behavioral: Negative.        Physical Exam     Initial Vitals   BP Pulse Resp Temp SpO2   06/30/22 1214 06/30/22 1214 06/30/22 1214 06/30/22 1222 06/30/22 1214   122/87 90 19 98.4 °F (36.9 °C) 96 %      MAP       --                Physical Exam    Nursing note and vitals reviewed.  Constitutional: She appears well-developed and well-nourished.    HENT:   Head: Normocephalic and atraumatic.   Eyes: EOM are normal. Pupils are equal, round, and reactive to light.   Neck: Neck supple.   Normal range of motion.  Cardiovascular: Normal rate.   Pulmonary/Chest: No respiratory distress. She has wheezes.   Abdominal: Abdomen is soft.   Musculoskeletal:         General: No tenderness.      Cervical back: Normal range of motion and neck supple.     Neurological: She is alert and oriented to person, place, and time.   Skin: Skin is warm. Capillary refill takes less than 2 seconds.   Psychiatric: She has a normal mood and affect.         ED Course   Critical Care    Date/Time: 6/30/2022 3:50 PM  Performed by: Yuniel Kirk MD  Authorized by: Yuniel Kirk MD   Direct patient critical care time: 30 minutes  Additional history critical care time: 5 minutes  Ordering / reviewing critical care time: 10 minutes  Documentation critical care time: 5 minutes  Total critical care time (exclusive of procedural time) : 50 minutes  Critical care was necessary to treat or prevent imminent or life-threatening deterioration of the following conditions: respiratory failure.  Critical care was time spent personally by me on the following activities: development of treatment plan with patient or surrogate, discussions with consultants, interpretation of cardiac output measurements, evaluation of patient's response to treatment, examination of patient, obtaining history from patient or surrogate, ordering and performing treatments and interventions, ordering and review of laboratory studies, ordering and review of radiographic studies, pulse oximetry, re-evaluation of patient's condition and review of old charts.  Comments: Patient received steroids and 6 nebs while in the ED with good improvement in O2 saturation and resolution of wheezing.  She feels much better.  Ready for discharge home.        Labs Reviewed   COMPREHENSIVE METABOLIC PANEL - Abnormal; Notable for the following  components:       Result Value    Carbon Dioxide 33 (*)     Glucose Level 122 (*)     Calcium Level Total 10.6 (*)     Globulin 4.0 (*)     Albumin/Globulin Ratio 0.9 (*)     All other components within normal limits   CBC WITH DIFFERENTIAL - Abnormal; Notable for the following components:    Hgb 11.8 (*)     MCHC 30.5 (*)     IG# 0.02 (*)     All other components within normal limits   B-TYPE NATRIURETIC PEPTIDE - Normal   TROPONIN I - Normal   COVID/FLU A&B PCR - Normal   CBC W/ AUTO DIFFERENTIAL    Narrative:     The following orders were created for panel order CBC auto differential.  Procedure                               Abnormality         Status                     ---------                               -----------         ------                     CBC with Differential[[id number]]        Abnormal            Final result                 Please view results for these tests on the individual orders.          Imaging Results          X-Ray Chest 1 View (Final result)  Result time 06/30/22 13:05:22    Final result by Jeff Quijano MD (06/30/22 13:05:22)                 Impression:      Streaky opacities towards the right lung base, atelectasis versus infection.      Electronically signed by: Jeff Quijano  Date:    06/30/2022  Time:    13:05             Narrative:    EXAMINATION:  XR CHEST 1 VIEW    CLINICAL HISTORY:  sob;    COMPARISON:  20 May 2022    FINDINGS:  Portable frontal view of the chest was obtained. Heart is not significantly enlarged.  There are streaky opacities towards the right lung base.  There is no pneumothorax or significant effusion                                 Medications   albuterol-ipratropium 2.5 mg-0.5 mg/3 mL nebulizer solution 3 mL (3 mLs Nebulization Given by Other 6/30/22 1301)   dexamethasone injection 8 mg (8 mg Intravenous Given 6/30/22 1310)   albuterol-ipratropium 2.5 mg-0.5 mg/3 mL nebulizer solution 3 mL (3 mLs Nebulization Given by Other 6/30/22 1402)                  ED Course as of 06/30/22 1552   Thu Jun 30, 2022   1347 Patient feels better after steroids and 3 nebs.  Satting 91-92% on room air.  Still with mild wheezing.  Will give another round of nebs and re-evaluate. [AG]      ED Course User Index  [AG] Yuniel Kirk MD             Clinical Impression:   Final diagnoses:  [J44.1] COPD exacerbation (Primary)          ED Disposition Condition    Discharge Stable        ED Prescriptions     Medication Sig Dispense Start Date End Date Auth. Provider    methylPREDNISolone (MEDROL DOSEPACK) 4 mg tablet use as directed 21 each 6/30/2022  Yuniel Kirk MD    levoFLOXacin (LEVAQUIN) 750 MG tablet Take 1 tablet (750 mg total) by mouth once daily. for 7 days 7 tablet 6/30/2022 7/7/2022 Yuniel Kirk MD        Follow-up Information     Follow up With Specialties Details Why Contact Info    Jeannine Zhao NP Internal Medicine   [address] [place] [id number] [phone]             Yuniel Kirk MD  06/30/22 1551

## 2022-07-13 ENCOUNTER — LAB VISIT (OUTPATIENT)
Dept: LAB | Facility: HOSPITAL | Age: 52
End: 2022-07-13
Attending: NURSE PRACTITIONER
Payer: MEDICAID

## 2022-07-13 DIAGNOSIS — E11.9 DIABETES MELLITUS WITHOUT COMPLICATION: Primary | ICD-10-CM

## 2022-07-13 DIAGNOSIS — I10 ESSENTIAL HYPERTENSION, MALIGNANT: ICD-10-CM

## 2022-07-13 LAB
ALBUMIN SERPL-MCNC: 3.5 GM/DL (ref 3.5–5)
ALBUMIN/GLOB SERPL: 1 RATIO (ref 1.1–2)
ALP SERPL-CCNC: 69 UNIT/L (ref 40–150)
ALT SERPL-CCNC: 70 UNIT/L (ref 0–55)
AST SERPL-CCNC: 27 UNIT/L (ref 5–34)
BILIRUBIN DIRECT+TOT PNL SERPL-MCNC: 0.4 MG/DL
BUN SERPL-MCNC: 15.8 MG/DL (ref 9.8–20.1)
CALCIUM SERPL-MCNC: 10.5 MG/DL (ref 8.4–10.2)
CHLORIDE SERPL-SCNC: 100 MMOL/L (ref 98–107)
CHOLEST SERPL-MCNC: 155 MG/DL
CHOLEST/HDLC SERPL: 3 {RATIO} (ref 0–5)
CO2 SERPL-SCNC: 32 MMOL/L (ref 22–29)
CREAT SERPL-MCNC: 0.94 MG/DL (ref 0.55–1.02)
EST. AVERAGE GLUCOSE BLD GHB EST-MCNC: 111.2 MG/DL
FT4I SERPL CALC-MCNC: 2.97 (ref 2.6–3.6)
GLOBULIN SER-MCNC: 3.5 GM/DL (ref 2.4–3.5)
GLUCOSE SERPL-MCNC: 109 MG/DL (ref 74–100)
HBA1C MFR BLD: 5.5 %
HDLC SERPL-MCNC: 54 MG/DL (ref 35–60)
LDLC SERPL CALC-MCNC: 57 MG/DL (ref 50–140)
POTASSIUM SERPL-SCNC: 4.1 MMOL/L (ref 3.5–5.1)
PROT SERPL-MCNC: 7 GM/DL (ref 6.4–8.3)
SODIUM SERPL-SCNC: 142 MMOL/L (ref 136–145)
T3RU NFR SERPL: 35.99 % (ref 31–39)
T4 SERPL-MCNC: 8.25 UG/DL (ref 4.87–11.72)
TRIGL SERPL-MCNC: 218 MG/DL (ref 37–140)
TSH SERPL-ACNC: 2.84 UIU/ML (ref 0.35–4.94)
VLDLC SERPL CALC-MCNC: 44 MG/DL

## 2022-07-13 PROCEDURE — 36415 COLL VENOUS BLD VENIPUNCTURE: CPT

## 2022-07-13 PROCEDURE — 80053 COMPREHEN METABOLIC PANEL: CPT

## 2022-07-13 PROCEDURE — 83036 HEMOGLOBIN GLYCOSYLATED A1C: CPT

## 2022-07-13 PROCEDURE — 84479 ASSAY OF THYROID (T3 OR T4): CPT

## 2022-07-13 PROCEDURE — 84443 ASSAY THYROID STIM HORMONE: CPT

## 2022-07-13 PROCEDURE — 80061 LIPID PANEL: CPT

## 2022-07-13 PROCEDURE — 84436 ASSAY OF TOTAL THYROXINE: CPT

## 2022-09-25 ENCOUNTER — HOSPITAL ENCOUNTER (EMERGENCY)
Facility: HOSPITAL | Age: 52
Discharge: HOME | End: 2022-09-25
Attending: EMERGENCY MEDICINE
Payer: MEDICAID

## 2022-09-25 VITALS
DIASTOLIC BLOOD PRESSURE: 92 MMHG | HEIGHT: 67 IN | BODY MASS INDEX: 44.89 KG/M2 | HEART RATE: 69 BPM | OXYGEN SATURATION: 100 % | WEIGHT: 286 LBS | TEMPERATURE: 98 F | RESPIRATION RATE: 19 BRPM | SYSTOLIC BLOOD PRESSURE: 137 MMHG
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
## 2022-09-25 DIAGNOSIS — S00.03XA CONTUSION OF SCALP, INITIAL ENCOUNTER: ICD-10-CM

## 2022-09-25 LAB
ALBUMIN SERPL-MCNC: 3.6 GM/DL (ref 3.5–5)
ALBUMIN/GLOB SERPL: 0.9 RATIO (ref 1.1–2)
ALP SERPL-CCNC: 64 UNIT/L (ref 40–150)
ALT SERPL-CCNC: 17 UNIT/L (ref 0–55)
AST SERPL-CCNC: 16 UNIT/L (ref 5–34)
BASOPHILS # BLD AUTO: 0.04 X10(3)/MCL (ref 0–0.2)
BASOPHILS NFR BLD AUTO: 0.5 %
BILIRUBIN DIRECT+TOT PNL SERPL-MCNC: 0.3 MG/DL
BUN SERPL-MCNC: 17.4 MG/DL (ref 9.8–20.1)
CALCIUM SERPL-MCNC: 10.3 MG/DL (ref 8.4–10.2)
CHLORIDE SERPL-SCNC: 100 MMOL/L (ref 98–107)
CO2 SERPL-SCNC: 30 MMOL/L (ref 22–29)
CREAT SERPL-MCNC: 0.79 MG/DL (ref 0.55–1.02)
EOSINOPHIL # BLD AUTO: 0.13 X10(3)/MCL (ref 0–0.9)
EOSINOPHIL NFR BLD AUTO: 1.5 %
ERYTHROCYTE [DISTWIDTH] IN BLOOD BY AUTOMATED COUNT: 15 % (ref 11.5–17)
GFR SERPLBLD CREATININE-BSD FMLA CKD-EPI: >60 MLS/MIN/1.73/M2
GLOBULIN SER-MCNC: 3.9 GM/DL (ref 2.4–3.5)
GLUCOSE SERPL-MCNC: 104 MG/DL (ref 74–100)
HCT VFR BLD AUTO: 38 % (ref 37–47)
HGB BLD-MCNC: 11.3 GM/DL (ref 12–16)
IMM GRANULOCYTES # BLD AUTO: 0.05 X10(3)/MCL (ref 0–0.04)
IMM GRANULOCYTES NFR BLD AUTO: 0.6 %
LYMPHOCYTES # BLD AUTO: 1.54 X10(3)/MCL (ref 0.6–4.6)
LYMPHOCYTES NFR BLD AUTO: 18.3 %
MCH RBC QN AUTO: 26.7 PG (ref 27–31)
MCHC RBC AUTO-ENTMCNC: 29.7 MG/DL (ref 33–36)
MCV RBC AUTO: 89.6 FL (ref 80–94)
MONOCYTES # BLD AUTO: 0.5 X10(3)/MCL (ref 0.1–1.3)
MONOCYTES NFR BLD AUTO: 6 %
NEUTROPHILS # BLD AUTO: 6.1 X10(3)/MCL (ref 2.1–9.2)
NEUTROPHILS NFR BLD AUTO: 73.1 %
NRBC BLD AUTO-RTO: 0 %
PLATELET # BLD AUTO: 264 X10(3)/MCL (ref 130–400)
PMV BLD AUTO: 9.7 FL (ref 7.4–10.4)
POTASSIUM SERPL-SCNC: 4.4 MMOL/L (ref 3.5–5.1)
PROT SERPL-MCNC: 7.5 GM/DL (ref 6.4–8.3)
RBC # BLD AUTO: 4.24 X10(6)/MCL (ref 4.2–5.4)
SODIUM SERPL-SCNC: 141 MMOL/L (ref 136–145)
WBC # SPEC AUTO: 8.4 X10(3)/MCL (ref 4.5–11.5)

## 2022-09-25 PROCEDURE — 36415 COLL VENOUS BLD VENIPUNCTURE: CPT | Performed by: NURSE PRACTITIONER

## 2022-09-25 PROCEDURE — 80053 COMPREHEN METABOLIC PANEL: CPT | Performed by: NURSE PRACTITIONER

## 2022-09-25 PROCEDURE — 85025 COMPLETE CBC W/AUTO DIFF WBC: CPT | Performed by: NURSE PRACTITIONER

## 2022-09-25 PROCEDURE — 99284 EMERGENCY DEPT VISIT MOD MDM: CPT | Mod: 25

## 2022-09-25 RX ORDER — ACETAMINOPHEN 500 MG
1000 TABLET ORAL
Status: DISCONTINUED | OUTPATIENT
Start: 2022-09-25 | End: 2022-09-25 | Stop reason: HOSPADM

## 2022-09-25 NOTE — ED PROVIDER NOTES
Encounter Date: 9/25/2022       History     Chief Complaint   Patient presents with    Fall     Slip fall at home from standing.  States hit head on plastic commode.  -loc, denies blood thinners.  Pt is aaox4.      51 y.o. female presents to the ED with fall when transferring from bed to bedside commode, hitting the back of her head. Denies LOC, neck pain, back pain. 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 Denies headache, n/v, blurred vision. States she was just concerned and wanted to be checked out.    The history is provided by the patient. No  was used.   Fall  The accident occurred just prior to arrival. Fall occurred: while transferring. She fell from a height of 1 to 2 ft. She landed on A hard floor. There was no blood loss. The point of impact was the head. There was No entrapment after the fall. There was No alcohol use involved in the accident. Pertinent negatives include no neck pain, no back pain, no visual change, no fever and no nausea.   Review of patient's allergies indicates:   Allergen Reactions    Bactrim [sulfamethoxazole-trimethoprim] Hives and Other (See Comments)     Other reaction(s): Other (See Comments)     Past Medical History:   Diagnosis Date    COPD exacerbation 05/21/2022    Hypertension     Sleep apnea     Stroke      No past surgical history on file.  Family History   Family history unknown: Yes     Social History     Tobacco Use    Smoking status: Former     Packs/day: 2.00     Types: Cigarettes     Quit date: 2019     Years since quitting: 3.7    Smokeless tobacco: Former     Quit date: 2019    Tobacco comments:     quit smoking 3 yrs ago   Substance Use Topics    Alcohol use: Never    Drug use: Never     Review of Systems   Constitutional:  Negative for fever.   HENT:  Negative for sore throat.    Respiratory:  Negative for shortness of breath.    Cardiovascular:  Negative for chest pain.   Gastrointestinal:  Negative for nausea.    Genitourinary:  Negative for dysuria.   Musculoskeletal:  Negative for back pain and neck pain.   Skin:  Negative for rash.   Neurological:  Negative for weakness.   Hematological:  Does not bruise/bleed easily.   All other systems reviewed and are negative.    Physical Exam     Initial Vitals [09/25/22 1205]   BP Pulse Resp Temp SpO2   136/80 77 19 97.7 °F (36.5 °C) 98 %      MAP       --         Physical Exam    Nursing note and vitals reviewed.  Constitutional: She appears well-developed and well-nourished.   HENT:   Head: Normocephalic and atraumatic.   Eyes: Conjunctivae and EOM are normal. Pupils are equal, round, and reactive to light.   Neck: Neck supple.   Cardiovascular:  Normal rate, regular rhythm and normal heart sounds.           Pulmonary/Chest: Breath sounds normal.   Abdominal: Abdomen is soft. Bowel sounds are normal.   Musculoskeletal:         General: Normal range of motion.      Cervical back: Neck supple.     Neurological: She is alert and oriented to person, place, and time. GCS score is 15. GCS eye subscore is 4. GCS verbal subscore is 5. GCS motor subscore is 6.   Left-side residual deficits noted from previous CVA   Skin: Skin is warm and dry.   Psychiatric: She has a normal mood and affect.       ED Course   Procedures  Labs Reviewed   COMPREHENSIVE METABOLIC PANEL - Abnormal; Notable for the following components:       Result Value    Carbon Dioxide 30 (*)     Glucose Level 104 (*)     Calcium Level Total 10.3 (*)     Globulin 3.9 (*)     Albumin/Globulin Ratio 0.9 (*)     All other components within normal limits   CBC WITH DIFFERENTIAL - Abnormal; Notable for the following components:    Hgb 11.3 (*)     MCH 26.7 (*)     MCHC 29.7 (*)     IG# 0.05 (*)     All other components within normal limits   CBC W/ AUTO DIFFERENTIAL    Narrative:     The following orders were created for panel order CBC auto differential.  Procedure                               Abnormality         Status                      ---------                               -----------         ------                     CBC with Differential[[id number]]        Abnormal            Final result                 Please view results for these tests on the individual orders.          Imaging Results              CT Head Without Contrast (Final result)  Result time 09/25/22 15:19:39      Final result by Suresh Stevens MD (09/25/22 15:19:39)                   Impression:      Area of encephalomalacia in the right frontal and parietal region.  No evidence of acute hemorrhage or infarction is seen.      Electronically signed by: Suresh Stevens  Date:    09/25/2022  Time:    15:19               Narrative:    EXAMINATION:  CT HEAD WITHOUT CONTRAST    CLINICAL HISTORY:  Head trauma, GCS=15, loss of consciousness (LOC) (Ped 0-18y);    TECHNIQUE:  Multiple axial images were obtained from the base of the brain to the vertex without contrast administration.  Sagittal and coronal reconstructions were performed..Automatic exposure control is utilized to reduce patient radiation exposure.    COMPARISON:  03/28/2019    FINDINGS:  There is no intracranial mass or lesion seen.  No hemorrhage is seen.  There is a large area of encephalomalacia is seen in the right frontal and parietal region..  No evidence of acute infarct is seen.  No acute hemorrhage is seen.  The ventricles and basilar cisterns appear normal.  Brain parenchyma appears grossly unremarkable.    Posterior fossa appears normal.  The calvarium is intact.  The patient has undergone previous craniotomy in the right frontal region.  The paranasal sinuses appear grossly unremarkable.                                       Medications - No data to display    Medical Decision Making:   Differential Diagnosis:   Worried well, fall, contusion, ICH  Clinical Tests:   Lab Tests: Reviewed  Radiological Study: Reviewed  ED Management:  CT showing encephalomalacia from previous CVAs. Nothing  acute. Patient feels fine, no complaints at this time.              ED Course as of 09/28/22 1127   Sun Sep 25, 2022   1607 Patient O2 sat 99% on RA [MA]      ED Course User Index  [MA] Anselmo Tarango PA-C                 Clinical Impression:   Final diagnoses:  [W19.XXXA] Fall, initial encounter (Primary)  [S00.03XA] Contusion of scalp, initial encounter        ED Disposition Condition    Discharge Stable          ED Prescriptions    None       Follow-up Information       Follow up With Specialties Details Why Contact Info    Jeannine Zhao NP Internal Medicine   [address] [place] [id number]  [phone]      [facility] - Emergency Dept Emergency Medicine In 1 week If symptoms worsen [address] [place] [id number] [phone]             Anselmo Tarango PA-C  09/28/22 1127

## 2022-09-25 NOTE — FIRST PROVIDER EVALUATION
"Medical screening examination initiated.  I have conducted a focused provider triage encounter, findings are as follows:    Brief history of present illness:  51-year-old female presents to ER after slip and fall hitting her head, occipital region, on toilet.  The patient is usually not on oxygen but reports EMS stated her oxygen was 92% on room air so therefore they put her on 2 L nasal cannula.  GCS 15.     Vitals:    09/25/22 1205   BP: 136/80   Pulse: 77   Resp: 19   Temp: 97.7 °F (36.5 °C)   SpO2: 98%   Weight: 129.7 kg (286 lb)   Height: 5' 7" (1.702 m)       Pertinent physical exam:  AAO x3    Brief workup plan:  CT and labs    Preliminary workup initiated; this workup will be continued and followed by the physician or advanced practice provider that is assigned to the patient when roomed.  "

## 2023-03-16 ENCOUNTER — HOSPITAL ENCOUNTER (OUTPATIENT)
Dept: RADIOLOGY | Facility: HOSPITAL | Age: 53
Discharge: HOME | End: 2023-03-16
Attending: NURSE PRACTITIONER
Payer: MEDICAID

## 2023-03-16 DIAGNOSIS — Z12.31 BREAST CANCER SCREENING BY MAMMOGRAM: ICD-10-CM

## 2023-03-16 PROCEDURE — 77067 SCR MAMMO BI INCL CAD: CPT | Mod: TC

## 2023-03-16 PROCEDURE — 77063 MAMMO DIGITAL SCREENING BILAT WITH TOMO: ICD-10-PCS | Mod: 26,,, | Performed by: RADIOLOGY

## 2023-03-16 PROCEDURE — 77063 BREAST TOMOSYNTHESIS BI: CPT | Mod: 26,,, | Performed by: RADIOLOGY

## 2023-03-16 PROCEDURE — 77067 SCR MAMMO BI INCL CAD: CPT | Mod: 26,,, | Performed by: RADIOLOGY

## 2023-03-16 PROCEDURE — 77067 MAMMO DIGITAL SCREENING BILAT WITH TOMO: ICD-10-PCS | Mod: 26,,, | Performed by: RADIOLOGY

## 2024-04-19 ENCOUNTER — HOSPITAL ENCOUNTER (EMERGENCY)
Facility: HOSPITAL | Age: 54
Discharge: HOME | End: 2024-04-19
Attending: EMERGENCY MEDICINE
Payer: MEDICAID

## 2024-04-19 VITALS
SYSTOLIC BLOOD PRESSURE: 138 MMHG | TEMPERATURE: 98 F | HEART RATE: 72 BPM | HEIGHT: 67 IN | DIASTOLIC BLOOD PRESSURE: 93 MMHG | BODY MASS INDEX: 44.89 KG/M2 | RESPIRATION RATE: 20 BRPM | WEIGHT: 286 LBS | OXYGEN SATURATION: 91 %

## 2024-04-19 DIAGNOSIS — S31.000A SACRAL WOUND, INITIAL ENCOUNTER: Primary | ICD-10-CM

## 2024-04-19 LAB
ALBUMIN SERPL-MCNC: 3.4 G/DL (ref 3.5–5)
ALBUMIN/GLOB SERPL: 0.8 RATIO (ref 1.1–2)
ALP SERPL-CCNC: 69 UNIT/L (ref 40–150)
ALT SERPL-CCNC: 20 UNIT/L (ref 0–55)
AST SERPL-CCNC: 16 UNIT/L (ref 5–34)
BASOPHILS # BLD AUTO: 0.03 X10(3)/MCL
BASOPHILS NFR BLD AUTO: 0.4 %
BILIRUB SERPL-MCNC: 0.2 MG/DL
BUN SERPL-MCNC: 14.2 MG/DL (ref 9.8–20.1)
CALCIUM SERPL-MCNC: 9.9 MG/DL (ref 8.4–10.2)
CHLORIDE SERPL-SCNC: 105 MMOL/L (ref 98–107)
CO2 SERPL-SCNC: 27 MMOL/L (ref 22–29)
CREAT SERPL-MCNC: 0.83 MG/DL (ref 0.55–1.02)
EOSINOPHIL # BLD AUTO: 0.15 X10(3)/MCL (ref 0–0.9)
EOSINOPHIL NFR BLD AUTO: 2.1 %
ERYTHROCYTE [DISTWIDTH] IN BLOOD BY AUTOMATED COUNT: 15.2 % (ref 11.5–17)
GFR SERPLBLD CREATININE-BSD FMLA CKD-EPI: >60 MLS/MIN/1.73/M2
GLOBULIN SER-MCNC: 4.2 GM/DL (ref 2.4–3.5)
GLUCOSE SERPL-MCNC: 133 MG/DL (ref 74–100)
HCT VFR BLD AUTO: 35.4 % (ref 37–47)
HGB BLD-MCNC: 10.4 G/DL (ref 12–16)
IMM GRANULOCYTES # BLD AUTO: 0.03 X10(3)/MCL (ref 0–0.04)
IMM GRANULOCYTES NFR BLD AUTO: 0.4 %
LYMPHOCYTES # BLD AUTO: 2.02 X10(3)/MCL (ref 0.6–4.6)
LYMPHOCYTES NFR BLD AUTO: 27.7 %
MCH RBC QN AUTO: 26.1 PG (ref 27–31)
MCHC RBC AUTO-ENTMCNC: 29.4 G/DL (ref 33–36)
MCV RBC AUTO: 88.7 FL (ref 80–94)
MONOCYTES # BLD AUTO: 0.59 X10(3)/MCL (ref 0.1–1.3)
MONOCYTES NFR BLD AUTO: 8.1 %
NEUTROPHILS # BLD AUTO: 4.47 X10(3)/MCL (ref 2.1–9.2)
NEUTROPHILS NFR BLD AUTO: 61.3 %
NRBC BLD AUTO-RTO: 0 %
PLATELET # BLD AUTO: 242 X10(3)/MCL (ref 130–400)
PMV BLD AUTO: 9.4 FL (ref 7.4–10.4)
POTASSIUM SERPL-SCNC: 4.9 MMOL/L (ref 3.5–5.1)
PROT SERPL-MCNC: 7.6 GM/DL (ref 6.4–8.3)
RBC # BLD AUTO: 3.99 X10(6)/MCL (ref 4.2–5.4)
SODIUM SERPL-SCNC: 141 MMOL/L (ref 136–145)
WBC # SPEC AUTO: 7.29 X10(3)/MCL (ref 4.5–11.5)

## 2024-04-19 PROCEDURE — 80053 COMPREHEN METABOLIC PANEL: CPT | Performed by: PHYSICIAN ASSISTANT

## 2024-04-19 PROCEDURE — 85025 COMPLETE CBC W/AUTO DIFF WBC: CPT | Performed by: PHYSICIAN ASSISTANT

## 2024-04-19 PROCEDURE — 96372 THER/PROPH/DIAG INJ SC/IM: CPT | Performed by: PHYSICIAN ASSISTANT

## 2024-04-19 PROCEDURE — 63600175 PHARM REV CODE 636 W HCPCS: Mod: JZ,JG | Performed by: PHYSICIAN ASSISTANT

## 2024-04-19 PROCEDURE — 25500020 PHARM REV CODE 255: Performed by: PHYSICIAN ASSISTANT

## 2024-04-19 PROCEDURE — 25000003 PHARM REV CODE 250: Performed by: PHYSICIAN ASSISTANT

## 2024-04-19 PROCEDURE — 99285 EMERGENCY DEPT VISIT HI MDM: CPT | Mod: 25

## 2024-04-19 RX ORDER — CLINDAMYCIN HYDROCHLORIDE 300 MG/1
300 CAPSULE ORAL EVERY 6 HOURS
Qty: 28 CAPSULE | Refills: 0 | Status: SHIPPED | OUTPATIENT
Start: 2024-04-19 | End: 2024-04-26

## 2024-04-19 RX ORDER — SULFAMETHOXAZOLE AND TRIMETHOPRIM 800; 160 MG/1; MG/1
1 TABLET ORAL
Status: DISCONTINUED | OUTPATIENT
Start: 2024-04-19 | End: 2024-04-19

## 2024-04-19 RX ORDER — BACITRACIN ZINC 500 UNIT/G
OINTMENT (GRAM) TOPICAL 2 TIMES DAILY
Qty: 425 G | Refills: 0 | Status: SHIPPED | OUTPATIENT
Start: 2024-04-19

## 2024-04-19 RX ORDER — HYDROCODONE BITARTRATE AND ACETAMINOPHEN 10; 325 MG/1; MG/1
1 TABLET ORAL
Status: COMPLETED | OUTPATIENT
Start: 2024-04-19 | End: 2024-04-19

## 2024-04-19 RX ORDER — CLINDAMYCIN PHOSPHATE 150 MG/ML
300 INJECTION, SOLUTION INTRAVENOUS
Status: COMPLETED | OUTPATIENT
Start: 2024-04-19 | End: 2024-04-19

## 2024-04-19 RX ORDER — HYDROCODONE BITARTRATE AND ACETAMINOPHEN 5; 325 MG/1; MG/1
1 TABLET ORAL EVERY 6 HOURS PRN
Qty: 20 TABLET | Refills: 0 | Status: SHIPPED | OUTPATIENT
Start: 2024-04-19 | End: 2024-04-24

## 2024-04-19 RX ADMIN — HYDROCODONE BITARTRATE AND ACETAMINOPHEN 1 TABLET: 10; 325 TABLET ORAL at 04:04

## 2024-04-19 RX ADMIN — CLINDAMYCIN PHOSPHATE 300 MG: 150 INJECTION, SOLUTION INTRAMUSCULAR; INTRAVENOUS at 05:04

## 2024-04-19 RX ADMIN — IOHEXOL 94 ML: 350 INJECTION, SOLUTION INTRAVENOUS at 03:04

## 2024-04-19 NOTE — ED PROVIDER NOTES
Encounter Date: 2024       History     Chief Complaint   Patient presents with    Sore     Presents via [facility] with c/o sacral pressure sore. Spoke with PCP who advised to be seen in ED. Hx of CVA with left sided deficits.      53-year-old female presents to ED for evaluation of sacral wound.  Patient reports that she has a history of a CVA with left-sided deficits in his unable to get up and walk.  States that over the last 3 weeks she has been having pain to her buttock region.  Reports some bleeding.  Denies any drainage.  Denies any fever.  States she was told by PCP to come to ED for further evaluation    The history is provided by the patient. No  was used.     Review of patient's allergies indicates:   Allergen Reactions    Bactrim [sulfamethoxazole-trimethoprim] Hives and Other (See Comments)     Other reaction(s): Other (See Comments)     Past Medical History:   Diagnosis Date    COPD exacerbation 2022    Hypertension     Sleep apnea     Stroke      No past surgical history on file.  Family History   Family history unknown: Yes     Social History     Tobacco Use    Smoking status: Former     Current packs/day: 0.00     Types: Cigarettes     Quit date:      Years since quittin.3    Smokeless tobacco: Former     Quit date:     Tobacco comments:     quit smoking 3 yrs ago   Substance Use Topics    Alcohol use: Never    Drug use: Never     Review of Systems   Constitutional:  Negative for fever.   HENT:  Negative for sore throat.    Respiratory:  Negative for shortness of breath.    Cardiovascular:  Negative for chest pain.   Gastrointestinal:  Negative for nausea.   Genitourinary:  Negative for dysuria.   Musculoskeletal:  Negative for back pain.   Skin:  Positive for rash and wound.   Neurological:  Negative for weakness.   Hematological:  Does not bruise/bleed easily.       Physical Exam     Initial Vitals [24 1332]   BP Pulse Resp Temp SpO2   (!) 146/81 89 17  98.4 °F (36.9 °C) 98 %      MAP       --         Physical Exam    Nursing note and vitals reviewed.  Constitutional: She appears well-developed and well-nourished.   HENT:   Head: Normocephalic and atraumatic.   Right Ear: Tympanic membrane and external ear normal.   Left Ear: Tympanic membrane and external ear normal.   Mouth/Throat: Uvula is midline, oropharynx is clear and moist and mucous membranes are normal. No trismus in the jaw. No uvula swelling. No oropharyngeal exudate, posterior oropharyngeal edema or posterior oropharyngeal erythema.   Eyes: Conjunctivae are normal. Pupils are equal, round, and reactive to light.   Neck: Neck supple.   Normal range of motion.  Cardiovascular:  Normal rate, regular rhythm and normal heart sounds.           Pulmonary/Chest: Breath sounds normal. She has no wheezes. She has no rhonchi. She has no rales.   Abdominal: Abdomen is soft. Bowel sounds are normal. There is no abdominal tenderness.   Musculoskeletal:         General: Normal range of motion.      Cervical back: Normal range of motion and neck supple.     Neurological: She is alert and oriented to person, place, and time.   Skin: Skin is warm and dry.   Psychiatric: She has a normal mood and affect.         ED Course   Procedures  Labs Reviewed   COMPREHENSIVE METABOLIC PANEL - Abnormal; Notable for the following components:       Result Value    Glucose Level 133 (*)     Albumin Level 3.4 (*)     Globulin 4.2 (*)     Albumin/Globulin Ratio 0.8 (*)     All other components within normal limits   CBC WITH DIFFERENTIAL - Abnormal; Notable for the following components:    RBC 3.99 (*)     Hgb 10.4 (*)     Hct 35.4 (*)     MCH 26.1 (*)     MCHC 29.4 (*)     All other components within normal limits   CBC W/ AUTO DIFFERENTIAL    Narrative:     The following orders were created for panel order CBC auto differential.  Procedure                               Abnormality         Status                     ---------                                -----------         ------                     CBC with Differential[[id number]]       Abnormal            Final result                 Please view results for these tests on the individual orders.          Imaging Results              CT Pelvis With IV Contrast NO Oral Contrast (Final result)  Result time 04/19/24 15:59:48      Final result by Suresh Stevens MD (04/19/24 15:59:48)                   Impression:      Some inflammatory changes and stranding seen along the lateral pelvic lesion on the left side but this is not evaluated on this examination due to the patient's body habitus.  This area is outside the field of view.    Renal cysts    Otherwise unremarkable      Electronically signed by: Meño Stevens  Date:    04/19/2024  Time:    15:59               Narrative:    EXAMINATION:  CT PELVIS WITH IV CONTRAST    CLINICAL HISTORY:  Soft tissue infection suspected, pelvis, xray done;    TECHNIQUE:  Multiple axial images were obtained from the mid abdomen to the pubis symphysis after intravenous administration of contrast.  Sagittal and coronal reconstructions were performed.    Automatic exposure control (AEC) is utilized to reduce patient radiation exposure.    COMPARISON:  None    FINDINGS:  The inferior margin of the liver and spleen appear grossly unremarkable.  Gallbladder appears normal.  Both kidneys are normal in size.  There is some cysts seen in both kidneys.    The visualized portion of the bowel appears unremarkable.  No visualized colitis or diverticulitis is seen.  The appendix appears normal.    Uterus and ovaries appear grossly unremarkable.  Urinary bladder appears normal.    No soft tissue fluid collection is seen.  No abscess is seen.  There are some injection granuloma seen along the anterior abdominal wall pannus.  The lateral aspect of the pelvis especially on the left side is not seen due to the patient's body habitus..  There appear to be some inflammatory  changes in that region.  It is not well evaluated on this examination.                                       Medications   clindamycin injection 300 mg (has no administration in time range)   iohexoL (OMNIPAQUE 350) injection 94 mL (94 mLs Intravenous Given 4/19/24 1547)   HYDROcodone-acetaminophen  mg per tablet 1 tablet (1 tablet Oral Given 4/19/24 1623)     Medical Decision Making  53-year-old female presents to ED for evaluation of sacral wound.  Patient reports that she has a history of a CVA with left-sided deficits in his unable to get up and walk.  States that over the last 3 weeks she has been having pain to her buttock region.  Reports some bleeding.  Denies any drainage.  Denies any fever.  States she was told by PCP to come to ED for further evaluation    Differential diagnosis includes but isn't limited to sacral wound, pressure ulcer, abscess    Amount and/or Complexity of Data Reviewed  Labs: ordered. Decision-making details documented in ED Course.  Radiology: ordered.  Discussion of management or test interpretation with external provider(s): Patient afebrile and in no acute distress presents to ED for evaluation of sacral wound.  Wound noted without any significant induration or abscess.  Labs obtained no leukocytosis.  CT obtained showing no acute abnormality with abscess or osteomyelitis.  Will place on clindamycin as patient was allergic to Bactrim.  Will prescribe bacitracin to put on the area.  Discussed return ED precautions.  Discussed follow up with PCP.  Patient verbalizes understanding.    Risk  Prescription drug management.               ED Course as of 04/19/24 1659   Fri Apr 19, 2024   1655 WBC: 7.29 [SL]   1655 CT Pelvis With IV Contrast NO Oral Contrast [SL]   1656 FINDINGS:  The inferior margin of the liver and spleen appear grossly unremarkable.  Gallbladder appears normal.  Both kidneys are normal in size.  There is some cysts seen in both kidneys.     The visualized portion  of the bowel appears unremarkable.  No visualized colitis or diverticulitis is seen.  The appendix appears normal.     Uterus and ovaries appear grossly unremarkable.  Urinary bladder appears normal.     No soft tissue fluid collection is seen.  No abscess is seen.  There are some injection granuloma seen along the anterior abdominal wall pannus.  The lateral aspect of the pelvis especially on the left side is not seen due to the patient's body habitus..  There appear to be some inflammatory changes in that region.  It is not well evaluated on this examination.     Impression:     Some inflammatory changes and stranding seen along the lateral pelvic lesion on the left side but this is not evaluated on this examination due to the patient's body habitus.  This area is outside the field of view.     Renal cysts     Otherwise unremarkable   [SL]      ED Course User Index  [SL] Apoorva Cason PA                           Clinical Impression:  Final diagnoses:  [S31.000A] Sacral wound, initial encounter (Primary)          ED Disposition Condition    Discharge Stable          ED Prescriptions       Medication Sig Dispense Start Date End Date Auth. Provider    clindamycin (CLEOCIN) 300 MG capsule Take 1 capsule (300 mg total) by mouth every 6 (six) hours. for 7 days 28 capsule 4/19/2024 4/26/2024 Apoorva Cason PA    HYDROcodone-acetaminophen (NORCO) 5-325 mg per tablet Take 1 tablet by mouth every 6 (six) hours as needed for Pain. 20 tablet 4/19/2024 4/24/2024 Apoorva Cason PA          Follow-up Information       Follow up With Specialties Details Why Contact Info    Jeannine Zhao NP Internal Medicine   [address] [place] [id number]  [phone]               Apoorva Cason PA  04/19/24 0428

## 2024-04-19 NOTE — DISCHARGE INSTRUCTIONS
Take full course of antibiotics.  Apply topical bacitracin.  Keep area clean and dry.  Make sure you are moving around and not staying in one spot.  Follow up with PCP in 7-10 days

## 2024-07-31 DIAGNOSIS — J96.01 ACUTE RESPIRATORY FAILURE WITH HYPOXIA AND HYPERCARBIA: Primary | ICD-10-CM

## 2024-07-31 DIAGNOSIS — J96.02 ACUTE RESPIRATORY FAILURE WITH HYPOXIA AND HYPERCARBIA: Primary | ICD-10-CM

## 2024-10-15 ENCOUNTER — LAB REQUISITION (OUTPATIENT)
Dept: LAB | Facility: HOSPITAL | Age: 54
End: 2024-10-15
Payer: MEDICAID

## 2024-10-15 DIAGNOSIS — Z79.899 OTHER LONG TERM (CURRENT) DRUG THERAPY: ICD-10-CM

## 2024-10-15 LAB
25(OH)D3+25(OH)D2 SERPL-MCNC: 33 NG/ML (ref 30–80)
ALBUMIN SERPL-MCNC: 3.4 G/DL (ref 3.5–5)
ALBUMIN/GLOB SERPL: 1 RATIO (ref 1.1–2)
ALP SERPL-CCNC: 62 UNIT/L (ref 40–150)
ALT SERPL-CCNC: 20 UNIT/L (ref 0–55)
ANION GAP SERPL CALC-SCNC: 12 MEQ/L
AST SERPL-CCNC: 15 UNIT/L (ref 5–34)
BILIRUB SERPL-MCNC: 0.3 MG/DL
BUN SERPL-MCNC: 15.4 MG/DL (ref 9.8–20.1)
CALCIUM SERPL-MCNC: 9.5 MG/DL (ref 8.4–10.2)
CHLORIDE SERPL-SCNC: 104 MMOL/L (ref 98–107)
CHOLEST SERPL-MCNC: 140 MG/DL
CHOLEST/HDLC SERPL: 3 {RATIO} (ref 0–5)
CO2 SERPL-SCNC: 27 MMOL/L (ref 22–29)
CREAT SERPL-MCNC: 0.86 MG/DL (ref 0.55–1.02)
CREAT/UREA NIT SERPL: 18
ERYTHROCYTE [DISTWIDTH] IN BLOOD BY AUTOMATED COUNT: 16.1 % (ref 11.5–17)
GFR SERPLBLD CREATININE-BSD FMLA CKD-EPI: >60 ML/MIN/1.73/M2
GLOBULIN SER-MCNC: 3.4 GM/DL (ref 2.4–3.5)
GLUCOSE SERPL-MCNC: 117 MG/DL (ref 74–100)
HCT VFR BLD AUTO: 33 % (ref 37–47)
HDLC SERPL-MCNC: 52 MG/DL (ref 35–60)
HGB BLD-MCNC: 9.7 G/DL (ref 12–16)
LDLC SERPL CALC-MCNC: 50 MG/DL (ref 50–140)
MCH RBC QN AUTO: 25.2 PG (ref 27–31)
MCHC RBC AUTO-ENTMCNC: 29.4 G/DL (ref 33–36)
MCV RBC AUTO: 85.7 FL (ref 80–94)
NRBC BLD AUTO-RTO: 0 %
PLATELET # BLD AUTO: 257 X10(3)/MCL (ref 130–400)
PMV BLD AUTO: 10.1 FL (ref 7.4–10.4)
POTASSIUM SERPL-SCNC: 4.2 MMOL/L (ref 3.5–5.1)
PROT SERPL-MCNC: 6.8 GM/DL (ref 6.4–8.3)
RBC # BLD AUTO: 3.85 X10(6)/MCL (ref 4.2–5.4)
SODIUM SERPL-SCNC: 143 MMOL/L (ref 136–145)
TRIGL SERPL-MCNC: 189 MG/DL (ref 37–140)
TSH SERPL-ACNC: 4.49 UIU/ML (ref 0.35–4.94)
VLDLC SERPL CALC-MCNC: 38 MG/DL
WBC # BLD AUTO: 7.94 X10(3)/MCL (ref 4.5–11.5)

## 2024-10-15 PROCEDURE — 85027 COMPLETE CBC AUTOMATED: CPT | Performed by: INTERNAL MEDICINE

## 2024-10-15 PROCEDURE — 84443 ASSAY THYROID STIM HORMONE: CPT | Performed by: INTERNAL MEDICINE

## 2024-10-15 PROCEDURE — 80053 COMPREHEN METABOLIC PANEL: CPT | Performed by: INTERNAL MEDICINE

## 2024-10-15 PROCEDURE — 80061 LIPID PANEL: CPT | Performed by: INTERNAL MEDICINE

## 2024-10-15 PROCEDURE — 82306 VITAMIN D 25 HYDROXY: CPT | Performed by: INTERNAL MEDICINE

## 2025-04-02 ENCOUNTER — LAB REQUISITION (OUTPATIENT)
Dept: LAB | Facility: HOSPITAL | Age: 55
End: 2025-04-02
Payer: MEDICAID

## 2025-04-02 DIAGNOSIS — I63.9 CEREBRAL INFARCTION, UNSPECIFIED: ICD-10-CM

## 2025-04-02 DIAGNOSIS — J44.9 CHRONIC OBSTRUCTIVE PULMONARY DISEASE, UNSPECIFIED: ICD-10-CM

## 2025-04-02 DIAGNOSIS — E83.52 HYPERCALCEMIA: ICD-10-CM

## 2025-04-02 LAB
25(OH)D3+25(OH)D2 SERPL-MCNC: 34 NG/ML (ref 30–80)
ALBUMIN SERPL-MCNC: 3.3 G/DL (ref 3.5–5)
ALBUMIN/GLOB SERPL: 1 RATIO (ref 1.1–2)
ALP SERPL-CCNC: 61 UNIT/L (ref 40–150)
ALT SERPL-CCNC: 11 UNIT/L (ref 0–55)
ANION GAP SERPL CALC-SCNC: 10 MEQ/L
AST SERPL-CCNC: 14 UNIT/L (ref 11–45)
BASOPHILS # BLD AUTO: 0.04 X10(3)/MCL
BASOPHILS NFR BLD AUTO: 0.5 %
BILIRUB SERPL-MCNC: 0.4 MG/DL
BUN SERPL-MCNC: 10.9 MG/DL (ref 9.8–20.1)
CALCIUM SERPL-MCNC: 9.3 MG/DL (ref 8.4–10.2)
CHLORIDE SERPL-SCNC: 104 MMOL/L (ref 98–107)
CHOLEST SERPL-MCNC: 131 MG/DL
CHOLEST/HDLC SERPL: 3 {RATIO} (ref 0–5)
CO2 SERPL-SCNC: 27 MMOL/L (ref 22–29)
CREAT SERPL-MCNC: 0.84 MG/DL (ref 0.55–1.02)
CREAT/UREA NIT SERPL: 13
EOSINOPHIL # BLD AUTO: 0.24 X10(3)/MCL (ref 0–0.9)
EOSINOPHIL NFR BLD AUTO: 3.1 %
ERYTHROCYTE [DISTWIDTH] IN BLOOD BY AUTOMATED COUNT: 16.4 % (ref 11.5–17)
GFR SERPLBLD CREATININE-BSD FMLA CKD-EPI: >60 ML/MIN/1.73/M2
GLOBULIN SER-MCNC: 3.4 GM/DL (ref 2.4–3.5)
GLUCOSE SERPL-MCNC: 110 MG/DL (ref 74–100)
HCT VFR BLD AUTO: 36.2 % (ref 37–47)
HDLC SERPL-MCNC: 51 MG/DL (ref 35–60)
HGB BLD-MCNC: 10.5 G/DL (ref 12–16)
IMM GRANULOCYTES # BLD AUTO: 0.03 X10(3)/MCL (ref 0–0.04)
IMM GRANULOCYTES NFR BLD AUTO: 0.4 %
LDLC SERPL CALC-MCNC: 48 MG/DL (ref 50–140)
LYMPHOCYTES # BLD AUTO: 2.13 X10(3)/MCL (ref 0.6–4.6)
LYMPHOCYTES NFR BLD AUTO: 27.5 %
MCH RBC QN AUTO: 24.4 PG (ref 27–31)
MCHC RBC AUTO-ENTMCNC: 29 G/DL (ref 33–36)
MCV RBC AUTO: 84.2 FL (ref 80–94)
MONOCYTES # BLD AUTO: 0.45 X10(3)/MCL (ref 0.1–1.3)
MONOCYTES NFR BLD AUTO: 5.8 %
NEUTROPHILS # BLD AUTO: 4.86 X10(3)/MCL (ref 2.1–9.2)
NEUTROPHILS NFR BLD AUTO: 62.7 %
NRBC BLD AUTO-RTO: 0 %
PLATELET # BLD AUTO: 236 X10(3)/MCL (ref 130–400)
PMV BLD AUTO: 9.8 FL (ref 7.4–10.4)
POTASSIUM SERPL-SCNC: 4.2 MMOL/L (ref 3.5–5.1)
PROT SERPL-MCNC: 6.7 GM/DL (ref 6.4–8.3)
RBC # BLD AUTO: 4.3 X10(6)/MCL (ref 4.2–5.4)
SODIUM SERPL-SCNC: 141 MMOL/L (ref 136–145)
TRIGL SERPL-MCNC: 161 MG/DL (ref 37–140)
TSH SERPL-ACNC: 1.64 UIU/ML (ref 0.35–4.94)
VLDLC SERPL CALC-MCNC: 32 MG/DL
WBC # BLD AUTO: 7.75 X10(3)/MCL (ref 4.5–11.5)

## 2025-04-02 PROCEDURE — 84443 ASSAY THYROID STIM HORMONE: CPT | Performed by: INTERNAL MEDICINE

## 2025-04-02 PROCEDURE — 80053 COMPREHEN METABOLIC PANEL: CPT | Performed by: INTERNAL MEDICINE

## 2025-04-02 PROCEDURE — 80061 LIPID PANEL: CPT | Performed by: INTERNAL MEDICINE

## 2025-04-02 PROCEDURE — 85025 COMPLETE CBC W/AUTO DIFF WBC: CPT | Performed by: INTERNAL MEDICINE

## 2025-04-02 PROCEDURE — 82306 VITAMIN D 25 HYDROXY: CPT | Performed by: INTERNAL MEDICINE

## 2025-04-21 PROCEDURE — 87186 SC STD MICRODIL/AGAR DIL: CPT | Performed by: INTERNAL MEDICINE

## 2025-04-21 PROCEDURE — 81003 URINALYSIS AUTO W/O SCOPE: CPT | Performed by: INTERNAL MEDICINE

## 2025-04-22 ENCOUNTER — HOSPITAL ENCOUNTER (EMERGENCY)
Facility: HOSPITAL | Age: 55
End: 2025-04-23
Attending: STUDENT IN AN ORGANIZED HEALTH CARE EDUCATION/TRAINING PROGRAM
Payer: MEDICAID

## 2025-04-22 ENCOUNTER — LAB REQUISITION (OUTPATIENT)
Dept: LAB | Facility: HOSPITAL | Age: 55
End: 2025-04-22
Payer: MEDICAID

## 2025-04-22 DIAGNOSIS — R82.91 OTHER CHROMOABNORMALITIES OF URINE: ICD-10-CM

## 2025-04-22 DIAGNOSIS — N39.0 URINARY TRACT INFECTION, SITE NOT SPECIFIED: ICD-10-CM

## 2025-04-22 DIAGNOSIS — R06.02 SOB (SHORTNESS OF BREATH): ICD-10-CM

## 2025-04-22 DIAGNOSIS — R82.89 OTHER ABNORMAL FINDINGS ON CYTOLOGICAL AND HISTOLOGICAL EXAMINATION OF URINE: ICD-10-CM

## 2025-04-22 DIAGNOSIS — J44.9 CHRONIC OBSTRUCTIVE PULMONARY DISEASE, UNSPECIFIED COPD TYPE: Primary | ICD-10-CM

## 2025-04-22 DIAGNOSIS — R30.0 DYSURIA: ICD-10-CM

## 2025-04-22 LAB
ALBUMIN SERPL-MCNC: 2.7 G/DL (ref 3.5–5)
ALBUMIN/GLOB SERPL: 0.5 RATIO (ref 1.1–2)
ALP SERPL-CCNC: 90 UNIT/L (ref 40–150)
ALT SERPL-CCNC: 22 UNIT/L (ref 0–55)
ANION GAP SERPL CALC-SCNC: 9 MEQ/L
AST SERPL-CCNC: 25 UNIT/L (ref 11–45)
BACTERIA #/AREA URNS AUTO: ABNORMAL /HPF
BASOPHILS # BLD AUTO: 0.02 X10(3)/MCL
BASOPHILS NFR BLD AUTO: 0.3 %
BILIRUB SERPL-MCNC: 0.3 MG/DL
BILIRUB UR QL STRIP.AUTO: NEGATIVE
BNP BLD-MCNC: 110.3 PG/ML
BUN SERPL-MCNC: 16.7 MG/DL (ref 9.8–20.1)
CALCIUM SERPL-MCNC: 9.3 MG/DL (ref 8.4–10.2)
CHLORIDE SERPL-SCNC: 105 MMOL/L (ref 98–107)
CLARITY UR: ABNORMAL
CO2 SERPL-SCNC: 28 MMOL/L (ref 22–29)
COLOR UR AUTO: YELLOW
CREAT SERPL-MCNC: 0.96 MG/DL (ref 0.55–1.02)
CREAT/UREA NIT SERPL: 17
EOSINOPHIL # BLD AUTO: 0.01 X10(3)/MCL (ref 0–0.9)
EOSINOPHIL NFR BLD AUTO: 0.1 %
ERYTHROCYTE [DISTWIDTH] IN BLOOD BY AUTOMATED COUNT: 15.9 % (ref 11.5–17)
GFR SERPLBLD CREATININE-BSD FMLA CKD-EPI: >60 ML/MIN/1.73/M2
GLOBULIN SER-MCNC: 5.3 GM/DL (ref 2.4–3.5)
GLUCOSE SERPL-MCNC: 195 MG/DL (ref 74–100)
GLUCOSE UR QL STRIP: NEGATIVE
HCT VFR BLD AUTO: 34.6 % (ref 37–47)
HGB BLD-MCNC: 10.1 G/DL (ref 12–16)
HGB UR QL STRIP: ABNORMAL
IMM GRANULOCYTES # BLD AUTO: 0.08 X10(3)/MCL (ref 0–0.04)
IMM GRANULOCYTES NFR BLD AUTO: 1.1 %
KETONES UR QL STRIP: NEGATIVE
LEUKOCYTE ESTERASE UR QL STRIP: ABNORMAL
LYMPHOCYTES # BLD AUTO: 0.96 X10(3)/MCL (ref 0.6–4.6)
LYMPHOCYTES NFR BLD AUTO: 13.6 %
MCH RBC QN AUTO: 24.6 PG (ref 27–31)
MCHC RBC AUTO-ENTMCNC: 29.2 G/DL (ref 33–36)
MCV RBC AUTO: 84.4 FL (ref 80–94)
MONOCYTES # BLD AUTO: 0.31 X10(3)/MCL (ref 0.1–1.3)
MONOCYTES NFR BLD AUTO: 4.4 %
NEUTROPHILS # BLD AUTO: 5.7 X10(3)/MCL (ref 2.1–9.2)
NEUTROPHILS NFR BLD AUTO: 80.5 %
NITRITE UR QL STRIP: POSITIVE
NRBC BLD AUTO-RTO: 0 %
PH UR STRIP: 5.5 [PH]
PLATELET # BLD AUTO: 219 X10(3)/MCL (ref 130–400)
PMV BLD AUTO: 10.5 FL (ref 7.4–10.4)
POTASSIUM SERPL-SCNC: 4.3 MMOL/L (ref 3.5–5.1)
PROT SERPL-MCNC: 8 GM/DL (ref 6.4–8.3)
PROT UR QL STRIP: 100
RBC # BLD AUTO: 4.1 X10(6)/MCL (ref 4.2–5.4)
RBC #/AREA URNS AUTO: ABNORMAL /HPF
SODIUM SERPL-SCNC: 142 MMOL/L (ref 136–145)
SP GR UR STRIP.AUTO: 1.02 (ref 1–1.03)
SQUAMOUS #/AREA URNS AUTO: ABNORMAL /HPF
TROPONIN I SERPL-MCNC: <0.01 NG/ML (ref 0–0.04)
UROBILINOGEN UR STRIP-ACNC: 0.2
WBC # BLD AUTO: 7.08 X10(3)/MCL (ref 4.5–11.5)
WBC #/AREA URNS AUTO: >100 /HPF

## 2025-04-22 PROCEDURE — 27000221 HC OXYGEN, UP TO 24 HOURS

## 2025-04-22 PROCEDURE — 83880 ASSAY OF NATRIURETIC PEPTIDE: CPT | Performed by: STUDENT IN AN ORGANIZED HEALTH CARE EDUCATION/TRAINING PROGRAM

## 2025-04-22 PROCEDURE — 93010 ELECTROCARDIOGRAM REPORT: CPT | Mod: ,,, | Performed by: INTERNAL MEDICINE

## 2025-04-22 PROCEDURE — 93005 ELECTROCARDIOGRAM TRACING: CPT

## 2025-04-22 PROCEDURE — 80053 COMPREHEN METABOLIC PANEL: CPT | Performed by: STUDENT IN AN ORGANIZED HEALTH CARE EDUCATION/TRAINING PROGRAM

## 2025-04-22 PROCEDURE — 85025 COMPLETE CBC W/AUTO DIFF WBC: CPT | Performed by: STUDENT IN AN ORGANIZED HEALTH CARE EDUCATION/TRAINING PROGRAM

## 2025-04-22 PROCEDURE — 84484 ASSAY OF TROPONIN QUANT: CPT | Performed by: STUDENT IN AN ORGANIZED HEALTH CARE EDUCATION/TRAINING PROGRAM

## 2025-04-22 PROCEDURE — 99285 EMERGENCY DEPT VISIT HI MDM: CPT | Mod: 25

## 2025-04-23 VITALS
TEMPERATURE: 98 F | HEIGHT: 67 IN | SYSTOLIC BLOOD PRESSURE: 144 MMHG | OXYGEN SATURATION: 98 % | HEART RATE: 62 BPM | DIASTOLIC BLOOD PRESSURE: 89 MMHG | RESPIRATION RATE: 13 BRPM | BODY MASS INDEX: 43.95 KG/M2 | WEIGHT: 280 LBS

## 2025-04-23 LAB
OHS QRS DURATION: 88 MS
OHS QTC CALCULATION: 466 MS

## 2025-04-23 PROCEDURE — 25000242 PHARM REV CODE 250 ALT 637 W/ HCPCS: Performed by: STUDENT IN AN ORGANIZED HEALTH CARE EDUCATION/TRAINING PROGRAM

## 2025-04-23 PROCEDURE — 99900035 HC TECH TIME PER 15 MIN (STAT)

## 2025-04-23 PROCEDURE — 94640 AIRWAY INHALATION TREATMENT: CPT

## 2025-04-23 PROCEDURE — 99900031 HC PATIENT EDUCATION (STAT)

## 2025-04-23 PROCEDURE — 94760 N-INVAS EAR/PLS OXIMETRY 1: CPT

## 2025-04-23 RX ORDER — IPRATROPIUM BROMIDE AND ALBUTEROL SULFATE 2.5; .5 MG/3ML; MG/3ML
3 SOLUTION RESPIRATORY (INHALATION)
Status: COMPLETED | OUTPATIENT
Start: 2025-04-23 | End: 2025-04-23

## 2025-04-23 RX ADMIN — IPRATROPIUM BROMIDE AND ALBUTEROL SULFATE 3 ML: .5; 3 SOLUTION RESPIRATORY (INHALATION) at 02:04

## 2025-04-23 NOTE — DISCHARGE INSTRUCTIONS

## 2025-04-23 NOTE — ED PROVIDER NOTES
Encounter Date: 4/22/2025       History     Chief Complaint   Patient presents with    Shortness of Breath     Pt arrives via [facility], EMS bringing pt from [facility], hx of stroke , paralysis on the left side. Pt dx'd with pneumonia this morning. Pt reports SOB this morning however pt denies any SOB at this time. Pt is AAOx 4.       See MDM    The history is provided by the patient. No  was used.     Review of patient's allergies indicates:   Allergen Reactions    Bactrim [sulfamethoxazole-trimethoprim] Hives and Other (See Comments)     Other reaction(s): Other (See Comments)     Past Medical History:   Diagnosis Date    COPD exacerbation 05/21/2022    Hypertension     Sleep apnea     Stroke      No past surgical history on file.  Family History   Family history unknown: Yes     Social History[1]  Review of Systems   Respiratory:  Positive for shortness of breath.    All other systems reviewed and are negative.      Physical Exam     Initial Vitals [04/22/25 2041]   BP Pulse Resp Temp SpO2   128/80 60 18 98.6 °F (37 °C) 95 %      MAP       --         Physical Exam    Nursing note and vitals reviewed.  Constitutional: She appears well-developed and well-nourished. She is not diaphoretic. She is Obese .  Non-toxic appearance. She does not have a sickly appearance. She does not appear ill. No distress.   HENT:   Head: Normocephalic and atraumatic. Mouth/Throat: Oropharynx is clear and moist.   Cardiovascular:  Normal rate and regular rhythm.           Pulmonary/Chest: Breath sounds normal. No respiratory distress.   Limited exam due to patient's body habitus.  No obvious adventitious breath sounds on exam.  Patient initially on 2 L nasal cannula when I entered the room but I removed patient from nasal cannula and she remained satting >89% on RA   Abdominal: Abdomen is soft. Bowel sounds are normal.   Musculoskeletal:      Comments: Patient with left-sided deficits from previous stroke.   She is wheelchair-bound at baseline.     Neurological: She is alert and oriented to person, place, and time.   Skin: Skin is warm and dry.   Psychiatric: She has a normal mood and affect.         ED Course   Procedures  Labs Reviewed   COMPREHENSIVE METABOLIC PANEL - Abnormal       Result Value    Sodium 142      Potassium 4.3      Chloride 105      CO2 28      Glucose 195 (*)     Blood Urea Nitrogen 16.7      Creatinine 0.96      Calcium 9.3      Protein Total 8.0      Albumin 2.7 (*)     Globulin 5.3 (*)     Albumin/Globulin Ratio 0.5 (*)     Bilirubin Total 0.3      ALP 90      ALT 22      AST 25      eGFR >60      Anion Gap 9.0      BUN/Creatinine Ratio 17     B-TYPE NATRIURETIC PEPTIDE - Abnormal    Natriuretic Peptide 110.3 (*)    CBC WITH DIFFERENTIAL - Abnormal    WBC 7.08      RBC 4.10 (*)     Hgb 10.1 (*)     Hct 34.6 (*)     MCV 84.4      MCH 24.6 (*)     MCHC 29.2 (*)     RDW 15.9      Platelet 219      MPV 10.5 (*)     Neut % 80.5      Lymph % 13.6      Mono % 4.4      Eos % 0.1      Basophil % 0.3      Imm Grans % 1.1      Neut # 5.70      Lymph # 0.96      Mono # 0.31      Eos # 0.01      Baso # 0.02      Imm Gran # 0.08 (*)     NRBC% 0.0     TROPONIN I - Normal    Troponin-I <0.010     CBC W/ AUTO DIFFERENTIAL    Narrative:     The following orders were created for panel order CBC auto differential.  Procedure                               Abnormality         Status                     ---------                               -----------         ------                     CBC with Differential[[id number]]       Abnormal            Final result                 Please view results for these tests on the individual orders.     EKG Readings: (Independently Interpreted)   Initial Reading: No STEMI. Rhythm: Normal Sinus Rhythm. Heart Rate: 60. T Waves Flipped: III and V1. Axis: Normal. Clinical Impression: Normal Sinus Rhythm       Imaging Results              CT Chest Without Contrast (In process)                       X-Ray Chest AP (Final result)  Result time 04/22/25 21:39:37      Final result by Willis Quiroz MD (04/22/25 21:39:37)                   Impression:      No acute cardiopulmonary process identified.      Electronically signed by: Willis Quiroz  Date:    04/22/2025  Time:    21:39               Narrative:    EXAMINATION:  XR CHEST AP PORTABLE    CLINICAL HISTORY:  Sob;    TECHNIQUE:  One view    COMPARISON:  June 30, 2022.    FINDINGS:  Cardiopericardial silhouette appearance similar.  Lungs hypoventilatory changes without dense focal or segmental consolidation, congestive process, pleural effusions or pneumothorax.                                       Medications - No data to display  Medical Decision Making  The patient is a 54 y.o. female with a pertinent PMHX of stroke with left-sided residual deficits, COPD, HTN, sleep apnea who presents to the Emergency Department via EMS with a chief complaint of shortness of breath. Symptoms began today while the patient was lying down watching TV she states she became short of breath for approximately 1 hour which then resolved.  Patient states that last night she had a chest x-ray which showed pneumonia and was started on azithromycin and prednisone for this. Associated symptoms include nothing. Symptoms are aggravated with nothing and alleviated with nothing. The patient denies cough, congestion, fever, nausea, vomiting, chest pain, shortness of breath at this time, constipation, diarrhea, sick contacts she is aware of, pain, hemoptysis, hematemesis, lower extremity swelling, history of CHF, history of DVT. No other reported symptoms at this time.  Patient reports taking the azithromycin and prednisone she was prescribed for PNM.  She is not on oxygen at baseline.    Pertinent physical exam findings include Limited exam due to patient's body habitus.  No obvious adventitious breath sounds on exam.  Patient initially on 2 L nasal cannula when I entered the  "room but I removed patient from nasal cannula and she remained satting >89% on RA.  Vital signs stable.    CMP overall with no emergent findings.  Troponin less than 0.01.  .  CBC with no signs of acute infection.  CXR impression:  No acute cardiopulmonary process identified.        Portions of this note have been created with voice recognition software. Occasional "wrong-words" or "sound alike" substitutions may have occurred due to inherent limitations of voice software. Please read the note carefully and recognize, using context, word substitutions may have occurred.       Amount and/or Complexity of Data Reviewed  Labs: ordered. Decision-making details documented in ED Course.  Radiology: ordered. Decision-making details documented in ED Course.  ECG/medicine tests: ordered and independent interpretation performed. Decision-making details documented in ED Course.  Discussion of management or test interpretation with external provider(s): Discussed patient with Dr. Adams who will assume care patient due to shift ending. See his addendum/note for further information.    Risk  Prescription drug management.      Additional MDM:   Differential Diagnosis:   Judging by the patient's chief complaint and pertinent history, the patient has the following possible differential diagnoses, including but not limited to the following:  COPD exacerbation, pneumonia, CHF  Some of these are deemed to be lower likelihood and some more likely based on my physical exam and history combined with possible lab work and/or imaging studies. Please see the pertinent studies, and refer to the HPI. Some of these diagnoses will take further evaluation to fully rule out, perhaps as an outpatient and the patient was encouraged to follow up when discharged for more comprehensive evaluation.                ED Course as of 04/24/25 2209 Wed Apr 23, 2025   0737 I reviewed the NP/PA Sharon Groves's documentation, agree with the NP/PA's " assessment, and I had face to face time with the patient. See my independent MDM below.     My MDM:  ED assessment:  54-year-old female presents for evaluation after being diagnosed with pneumonia on an x-ray at her nursing home yesterday morning.  She had initially complain of shortness of breath however she states this has since resolved.  She denies any shortness of breath currently.  Pertinent vitals/exam findings:  Mild scattered wheezes bilaterally  DDx:  Pneumonia, infection, COPD, emphysema, ACS, arrhythmia  Review of labs/results:  WBC within normal limits, BNP mildly elevated 110, troponin negative, chemistry reassuring  My independent EKG/radiology interpretation:  Chest x-ray unremarkable, CT of the chest without consolidation  Discussion of management with other providers:   ED management:  Labs, imaging, medications  ED disposition/plan:  Discharge.  I have reassessed the patient, she denies any shortness of breath currently.  She states she uses a trilogy at night for her COPD.  She is not requiring any supplemental oxygen currently.  I engaged in shared decision-making with the patient, I offered observation with further respiratory treatments and close monitoring versus discharge with further outpatient care with a PCP.  Patient states she is ready to go home and she has had no symptoms throughout her emergency department stay.  I did discuss with her that if she develops any new or worsening symptoms, to return to the emergency department for further evaluation and she states she understands and will do so as needed.  Patient will continue on her antibiotics that she has already been prescribed.  She will take the remainder of her COPD prescriptions as prescribed as well.  Critical Care: none  ED Diagnoses:  SOB (shortness of breath)  Chronic obstructive pulmonary disease, unspecified COPD type (Primary)      [unfilled]  [unfilled]    El Adams MD  Emergency Medicine   [MC]      ED Course  User Index  [MC] El Adams MD                           Clinical Impression:  Final diagnoses:  [R06.02] SOB (shortness of breath)                     [1]   Social History  Tobacco Use    Smoking status: Former     Current packs/day: 0.00     Types: Cigarettes     Quit date:      Years since quittin.3    Smokeless tobacco: Former     Quit date:     Tobacco comments:     quit smoking 3 yrs ago   Substance Use Topics    Alcohol use: Never    Drug use: Never        Sharon Groves PA-C  [id number]

## 2025-04-24 LAB — BACTERIA UR CULT: ABNORMAL
